# Patient Record
Sex: MALE | Race: WHITE | NOT HISPANIC OR LATINO | Employment: FULL TIME | URBAN - METROPOLITAN AREA
[De-identification: names, ages, dates, MRNs, and addresses within clinical notes are randomized per-mention and may not be internally consistent; named-entity substitution may affect disease eponyms.]

---

## 2019-10-21 ENCOUNTER — OFFICE VISIT (OUTPATIENT)
Dept: FAMILY MEDICINE | Facility: CLINIC | Age: 64
End: 2019-10-21
Payer: MEDICARE

## 2019-10-21 ENCOUNTER — HOSPITAL ENCOUNTER (OUTPATIENT)
Dept: RADIOLOGY | Facility: HOSPITAL | Age: 64
Discharge: HOME | End: 2019-10-21
Attending: REGISTERED NURSE
Payer: MEDICARE

## 2019-10-21 VITALS
WEIGHT: 171.94 LBS | HEIGHT: 67 IN | DIASTOLIC BLOOD PRESSURE: 91 MMHG | SYSTOLIC BLOOD PRESSURE: 135 MMHG | OXYGEN SATURATION: 97 % | HEART RATE: 74 BPM | TEMPERATURE: 98 F | BODY MASS INDEX: 26.99 KG/M2

## 2019-10-21 DIAGNOSIS — E66.3 OVERWEIGHT (BMI 25.0-29.9): ICD-10-CM

## 2019-10-21 DIAGNOSIS — F41.8 MIXED ANXIETY AND DEPRESSIVE DISORDER: ICD-10-CM

## 2019-10-21 DIAGNOSIS — M25.562 CHRONIC PAIN OF LEFT KNEE: Primary | ICD-10-CM

## 2019-10-21 DIAGNOSIS — G89.29 CHRONIC PAIN OF LEFT KNEE: ICD-10-CM

## 2019-10-21 DIAGNOSIS — M25.562 CHRONIC PAIN OF LEFT KNEE: ICD-10-CM

## 2019-10-21 DIAGNOSIS — G89.29 CHRONIC PAIN OF LEFT KNEE: Primary | ICD-10-CM

## 2019-10-21 PROBLEM — Z86.711 HISTORY OF PULMONARY EMBOLISM: Status: ACTIVE | Noted: 2019-10-21

## 2019-10-21 PROBLEM — J45.909 CHILDHOOD ASTHMA WITHOUT COMPLICATION: Status: ACTIVE | Noted: 2019-10-21

## 2019-10-21 PROCEDURE — 73560 X-RAY EXAM OF KNEE 1 OR 2: CPT | Mod: TC,FY,PO,LT

## 2019-10-21 PROCEDURE — 99204 OFFICE O/P NEW MOD 45 MIN: CPT | Mod: PBBFAC,25,PO | Performed by: REGISTERED NURSE

## 2019-10-21 PROCEDURE — 99204 OFFICE O/P NEW MOD 45 MIN: CPT | Mod: S$PBB,,, | Performed by: REGISTERED NURSE

## 2019-10-21 PROCEDURE — 99999 PR PBB SHADOW E&M-NEW PATIENT-LVL IV: ICD-10-PCS | Mod: PBBFAC,,, | Performed by: REGISTERED NURSE

## 2019-10-21 PROCEDURE — 73560 XR KNEE 1 OR 2 VIEW LEFT: ICD-10-PCS | Mod: 26,LT,, | Performed by: RADIOLOGY

## 2019-10-21 PROCEDURE — 73560 X-RAY EXAM OF KNEE 1 OR 2: CPT | Mod: 26,LT,, | Performed by: RADIOLOGY

## 2019-10-21 PROCEDURE — 99999 PR PBB SHADOW E&M-NEW PATIENT-LVL IV: CPT | Mod: PBBFAC,,, | Performed by: REGISTERED NURSE

## 2019-10-21 PROCEDURE — 99204 PR OFFICE/OUTPT VISIT, NEW, LEVL IV, 45-59 MIN: ICD-10-PCS | Mod: S$PBB,,, | Performed by: REGISTERED NURSE

## 2019-10-21 RX ORDER — BUPROPION HYDROCHLORIDE 150 MG/1
150 TABLET ORAL DAILY
Qty: 30 TABLET | Refills: 0 | Status: SHIPPED | OUTPATIENT
Start: 2019-10-21 | End: 2022-10-24

## 2019-10-21 RX ORDER — ESCITALOPRAM OXALATE 10 MG/1
10 TABLET ORAL DAILY
Qty: 30 TABLET | Refills: 0 | Status: SHIPPED | OUTPATIENT
Start: 2019-10-21 | End: 2022-10-24

## 2019-10-21 NOTE — PROGRESS NOTES
"Subjective:       Patient ID: Kishore Sosa is a 64 y.o. male.    Chief Complaint   Patient presents with    Knee Pain       Knee Pain    There was no injury mechanism. The pain is present in the left knee. The pain is at a severity of 2/10. The pain has been fluctuating since onset. Pertinent negatives include no inability to bear weight, loss of motion, loss of sensation, muscle weakness, numbness or tingling. The symptoms are aggravated by movement and weight bearing. He has tried NSAIDs for the symptoms. The treatment provided mild relief.   Depression   Visit Type: follow-up  Patient presents with the following symptoms: anhedonia, decreased concentration, depressed mood, excessive worry, fatigue, feelings of hopelessness, feelings of worthlessness, insomnia, malaise and nervousness/anxiety.  Patient is not experiencing: chest pain, choking sensation, compulsions, confusion, dizziness, dry mouth, hypersomnia, hyperventilation, irritability, memory impairment, muscle tension, nausea, obsessions, palpitations, panic, shortness of breath, suicidal ideas, suicidal planning and thoughts of death.  Severity: causing significant distress   Sleep quality: non-restorative      PT has long h/o depression, was on Wellbutrin and Lexapro.  Not currently in treatment, no med in 5+ years.  Has not been in therapy 10+.  Having sleep problems, low interest in activities, feeling down many days in the week, feels isolated.  Denies suicidal ideations.  Anxiety triggered by foreclosure on home, phobia (does not check postal or e-mails), does not check bank-balance, is behind 2 yrs on taxes.  Family h/o panic disorder and anxiety in father.  Takes much energy to appear "normal" at work.  Has tried self-help exercises and self-hypnosis with only mild relief.  Works at home, plans to retire in 5 yrs.  He has contacted [facility] and is in the process of getting set up with a Telehealth Psychiatrist.  Wanting to restart medication " "until he can speak with MD.    He has completed the PHQ-9 and brings in copy ---- score of 11 ---- moderate depression.        Review of Systems   Constitutional: Negative.  Negative for irritability.   Respiratory: Negative.  Negative for choking and shortness of breath.    Cardiovascular: Negative.  Negative for palpitations.   Musculoskeletal: Positive for arthralgias. Negative for gait problem and joint swelling.   Neurological: Negative for tingling and numbness.   Psychiatric/Behavioral: Positive for decreased concentration, depression, dysphoric mood and sleep disturbance. Negative for confusion, self-injury and suicidal ideas. The patient is nervous/anxious and has insomnia.          Review of patient's allergies indicates:  No Known Allergies          Patient Active Problem List   Diagnosis    Chronic pain of left knee    Mixed anxiety and depressive disorder    Overweight (BMI 25.0-29.9)    History of pulmonary embolism    Childhood asthma without complication         Past medical, surgical, family and social histories have been reviewed today.        Objective:     Vitals:    10/21/19 1039   BP: (!) 135/91   Pulse: 74   Temp: 98.4 °F (36.9 °C)   SpO2: 97%   Weight: 78 kg (171 lb 15.3 oz)   Height: 5' 7" (1.702 m)   PainSc:   2   PainLoc: Knee         Physical Exam   Constitutional: He is oriented to person, place, and time. He appears well-developed and well-nourished. No distress.   Musculoskeletal:        Left knee: He exhibits normal range of motion, no swelling, no effusion, no deformity, no erythema, normal alignment and normal patellar mobility. Tenderness (with increased lateral crepitus) found.   Neurological: He is alert and oriented to person, place, and time.   Skin: He is not diaphoretic.   Psychiatric: His speech is normal. Judgment normal. His mood appears anxious. His affect is not blunt, not labile and not inappropriate. He is agitated. He is not slowed, not withdrawn and not actively " hallucinating. Thought content is not paranoid and not delusional. Cognition and memory are normal. He expresses no suicidal plans and no homicidal plans. He is attentive.         Diagnosis       1. Chronic pain of left knee    2. Mixed anxiety and depressive disorder    3. Overweight (BMI 25.0-29.9)          Assessment/ Plan     Chronic pain of left knee  · Ice, elevate.  · Advil and/or Tylenol prn pain.  · Xray pending.  · May need trial of PT or Orthopedic evaluation.  -     X-ray Knee Ortho Left; Future; Expected date: 10/21/2019    Mixed anxiety and depressive disorder  · Uncontrolled currently, med restarted per pt request.  · To see Psychiatrist through [facility].  -     escitalopram oxalate (LEXAPRO) 10 MG tablet; Take 1 tablet (10 mg total) by mouth once daily.  Dispense: 30 tablet; Refill: 0  -     buPROPion (WELLBUTRIN XL) 150 MG TB24 tablet; Take 1 tablet (150 mg total) by mouth once daily.  Dispense: 30 tablet; Refill: 0    Overweight (BMI 25.0-29.9)  · Healthy diet, regular exercise, weight reduction.  · Healthy lifestyle modifications.      Instructed to f/u in 1 month for annual wellness exam with PCP.  Follow-up in clinic as needed.        Patient Care Team:  Primary Doctor No as PCP - BESS Guerrero  [facility]

## 2021-12-20 ENCOUNTER — OFFICE VISIT (OUTPATIENT)
Dept: DERMATOLOGY | Facility: CLINIC | Age: 66
End: 2021-12-20
Payer: MEDICARE

## 2021-12-20 DIAGNOSIS — L57.0 ACTINIC KERATOSES: Primary | ICD-10-CM

## 2021-12-20 DIAGNOSIS — D48.5 NEOPLASM OF UNCERTAIN BEHAVIOR OF SKIN: ICD-10-CM

## 2021-12-20 PROCEDURE — 88305 TISSUE EXAM BY PATHOLOGIST: CPT | Mod: 26,,, | Performed by: PATHOLOGY

## 2021-12-20 PROCEDURE — 17003 DESTRUCT PREMALG LES 2-14: CPT | Mod: PBBFAC | Performed by: STUDENT IN AN ORGANIZED HEALTH CARE EDUCATION/TRAINING PROGRAM

## 2021-12-20 PROCEDURE — 11103 TANGNTL BX SKIN EA SEP/ADDL: CPT | Mod: S$PBB,,, | Performed by: STUDENT IN AN ORGANIZED HEALTH CARE EDUCATION/TRAINING PROGRAM

## 2021-12-20 PROCEDURE — 99999 PR PBB SHADOW E&M-EST. PATIENT-LVL III: CPT | Mod: PBBFAC,,, | Performed by: STUDENT IN AN ORGANIZED HEALTH CARE EDUCATION/TRAINING PROGRAM

## 2021-12-20 PROCEDURE — 17000 DESTRUCT PREMALG LESION: CPT | Mod: S$PBB,XS,, | Performed by: STUDENT IN AN ORGANIZED HEALTH CARE EDUCATION/TRAINING PROGRAM

## 2021-12-20 PROCEDURE — 17003 DESTRUCTION, PREMALIGNANT LESIONS; SECOND THROUGH 14 LESIONS: ICD-10-PCS | Mod: S$PBB,,, | Performed by: STUDENT IN AN ORGANIZED HEALTH CARE EDUCATION/TRAINING PROGRAM

## 2021-12-20 PROCEDURE — 17003 DESTRUCT PREMALG LES 2-14: CPT | Mod: S$PBB,,, | Performed by: STUDENT IN AN ORGANIZED HEALTH CARE EDUCATION/TRAINING PROGRAM

## 2021-12-20 PROCEDURE — 99203 OFFICE O/P NEW LOW 30 MIN: CPT | Mod: 25,S$PBB,, | Performed by: STUDENT IN AN ORGANIZED HEALTH CARE EDUCATION/TRAINING PROGRAM

## 2021-12-20 PROCEDURE — 99213 OFFICE O/P EST LOW 20 MIN: CPT | Mod: PBBFAC,25 | Performed by: STUDENT IN AN ORGANIZED HEALTH CARE EDUCATION/TRAINING PROGRAM

## 2021-12-20 PROCEDURE — 17000 PR DESTRUCTION(LASER SURGERY,CRYOSURGERY,CHEMOSURGERY),PREMALIGNANT LESIONS,FIRST LESION: ICD-10-PCS | Mod: S$PBB,XS,, | Performed by: STUDENT IN AN ORGANIZED HEALTH CARE EDUCATION/TRAINING PROGRAM

## 2021-12-20 PROCEDURE — 99999 PR PBB SHADOW E&M-EST. PATIENT-LVL III: ICD-10-PCS | Mod: PBBFAC,,, | Performed by: STUDENT IN AN ORGANIZED HEALTH CARE EDUCATION/TRAINING PROGRAM

## 2021-12-20 PROCEDURE — 88305 TISSUE EXAM BY PATHOLOGIST: ICD-10-PCS | Mod: 26,,, | Performed by: PATHOLOGY

## 2021-12-20 PROCEDURE — 88305 TISSUE EXAM BY PATHOLOGIST: CPT | Performed by: PATHOLOGY

## 2021-12-20 PROCEDURE — 99203 PR OFFICE/OUTPT VISIT, NEW, LEVL III, 30-44 MIN: ICD-10-PCS | Mod: 25,S$PBB,, | Performed by: STUDENT IN AN ORGANIZED HEALTH CARE EDUCATION/TRAINING PROGRAM

## 2021-12-20 PROCEDURE — 11102 PR TANGENTIAL BIOPSY, SKIN, SINGLE LESION: ICD-10-PCS | Mod: S$PBB,,, | Performed by: STUDENT IN AN ORGANIZED HEALTH CARE EDUCATION/TRAINING PROGRAM

## 2021-12-20 PROCEDURE — 11103 TANGNTL BX SKIN EA SEP/ADDL: CPT | Mod: PBBFAC | Performed by: STUDENT IN AN ORGANIZED HEALTH CARE EDUCATION/TRAINING PROGRAM

## 2021-12-20 PROCEDURE — 11103 PR TANGENTIAL BIOPSY, SKIN, EA ADDTL LESION: ICD-10-PCS | Mod: S$PBB,,, | Performed by: STUDENT IN AN ORGANIZED HEALTH CARE EDUCATION/TRAINING PROGRAM

## 2021-12-20 PROCEDURE — 11102 TANGNTL BX SKIN SINGLE LES: CPT | Mod: PBBFAC | Performed by: STUDENT IN AN ORGANIZED HEALTH CARE EDUCATION/TRAINING PROGRAM

## 2021-12-20 PROCEDURE — 17000 DESTRUCT PREMALG LESION: CPT | Mod: PBBFAC | Performed by: STUDENT IN AN ORGANIZED HEALTH CARE EDUCATION/TRAINING PROGRAM

## 2021-12-20 PROCEDURE — 11102 TANGNTL BX SKIN SINGLE LES: CPT | Mod: S$PBB,,, | Performed by: STUDENT IN AN ORGANIZED HEALTH CARE EDUCATION/TRAINING PROGRAM

## 2021-12-20 RX ORDER — FLUOROURACIL 50 MG/G
CREAM TOPICAL
Qty: 40 G | Refills: 1 | Status: SHIPPED | OUTPATIENT
Start: 2021-12-20 | End: 2022-10-24

## 2021-12-21 ENCOUNTER — PATIENT MESSAGE (OUTPATIENT)
Dept: DERMATOLOGY | Facility: CLINIC | Age: 66
End: 2021-12-21
Payer: MEDICARE

## 2021-12-22 ENCOUNTER — TELEPHONE (OUTPATIENT)
Dept: FAMILY MEDICINE | Facility: CLINIC | Age: 66
End: 2021-12-22
Payer: MEDICARE

## 2021-12-23 DIAGNOSIS — C44.91 BASAL CELL CARCINOMA (BCC), UNSPECIFIED SITE: Primary | ICD-10-CM

## 2021-12-23 LAB
FINAL PATHOLOGIC DIAGNOSIS: NORMAL
GROSS: NORMAL
Lab: NORMAL
MICROSCOPIC EXAM: NORMAL

## 2022-01-06 ENCOUNTER — PROCEDURE VISIT (OUTPATIENT)
Dept: DERMATOLOGY | Facility: CLINIC | Age: 67
End: 2022-01-06
Payer: MEDICARE

## 2022-01-06 VITALS
DIASTOLIC BLOOD PRESSURE: 90 MMHG | SYSTOLIC BLOOD PRESSURE: 143 MMHG | HEIGHT: 67 IN | BODY MASS INDEX: 26.84 KG/M2 | WEIGHT: 171 LBS | HEART RATE: 74 BPM

## 2022-01-06 DIAGNOSIS — C44.91 BASAL CELL CARCINOMA (BCC), UNSPECIFIED SITE: ICD-10-CM

## 2022-01-06 PROCEDURE — 17312 MOHS ADDL STAGE: CPT | Mod: S$PBB,,, | Performed by: DERMATOLOGY

## 2022-01-06 PROCEDURE — 17311: ICD-10-PCS | Mod: S$PBB,51,, | Performed by: DERMATOLOGY

## 2022-01-06 PROCEDURE — 99214 PR OFFICE/OUTPT VISIT, EST, LEVL IV, 30-39 MIN: ICD-10-PCS | Mod: 57,S$PBB,, | Performed by: DERMATOLOGY

## 2022-01-06 PROCEDURE — 99214 OFFICE O/P EST MOD 30 MIN: CPT | Mod: 57,S$PBB,, | Performed by: DERMATOLOGY

## 2022-01-06 PROCEDURE — 14060 PR ADJ TISS XFER LID,NOS,EAR <10 SQCM: ICD-10-PCS | Mod: S$PBB,,, | Performed by: DERMATOLOGY

## 2022-01-06 PROCEDURE — 14060 TIS TRNFR E/N/E/L 10 SQ CM/<: CPT | Mod: S$PBB,,, | Performed by: DERMATOLOGY

## 2022-01-06 PROCEDURE — 14060 TIS TRNFR E/N/E/L 10 SQ CM/<: CPT | Mod: PBBFAC,PO | Performed by: DERMATOLOGY

## 2022-01-06 PROCEDURE — 17312 MOHS ADDL STAGE: CPT | Mod: PBBFAC,PO | Performed by: DERMATOLOGY

## 2022-01-06 PROCEDURE — 17312: ICD-10-PCS | Mod: S$PBB,,, | Performed by: DERMATOLOGY

## 2022-01-06 PROCEDURE — 17311 MOHS 1 STAGE H/N/HF/G: CPT | Mod: S$PBB,51,, | Performed by: DERMATOLOGY

## 2022-01-06 PROCEDURE — 17311 MOHS 1 STAGE H/N/HF/G: CPT | Mod: PBBFAC,PO | Performed by: DERMATOLOGY

## 2022-01-06 RX ORDER — DOXYCYCLINE 100 MG/1
100 CAPSULE ORAL 2 TIMES DAILY
Qty: 14 CAPSULE | Refills: 0 | Status: SHIPPED | OUTPATIENT
Start: 2022-01-06 | End: 2022-01-13

## 2022-01-06 NOTE — PATIENT INSTRUCTIONS
It was a pleasure to see you today in clinic. Here is some helpful information regarding instructions following your surgery.      Stitches: will not dissolve on their own    Follow up:   * If you have a problem or concern post-operatively, please call ahead to schedule an appointment. We may not be able to accommodate a walk-in appointment *     Scars may take 3 months or longer to mature. If you have concerns about your scar at that point, please call our office to schedule a follow up appointment. There are options available to help improve the appearance.     Please continue wound care below once a day for 1 weeks:    WOUND CARE INSTRUCTIONS   *Washing your hands before touching your bandage and surgical site is extremely important to prevent post-operative infection*    1. Leave your pressure bandage on for 48 hours. You will not need to perform any wound care until this bandage is removed. Do NOT get bandage wet.     2. When you initially begin wound care, you may let the water hit the pressure bandage to loosen it from your skin.     3. Wash your hands thoroughly before starting wound care.     4. After 48 hours, begin cleaning the surgery site once daily with gentle soap (such as Cetaphil, Cerave or Dove). Use a Q-tip with the soap and water on it. Roll the Q-tip along incision line.     5. Dry the area with a fresh cotton tipped applicator/Q-tip.     6. Apply a generous amount of vaseline where you see the sutures. Avoid using Neosporin, or any bacterial ointment as this is likely to cause an allergic reaction to the site.     7. Cut a non-stick bandage to fit then use paper tape to hold in place.     8. You will be using gentle soap and water, vaseline and a bandage once daily for 1 week(s).     9. After surgery, you may restart all of your medications that were stopped (if applicable).     *If your site is on your forehead, or near your eye, you will want to use ice packs. Please apply ice packs every  hour for 20 minutes while awake. Sleep elevated for the first two nights following surgery*     *For surgical areas on your arms/legs, try to keep the area elevated above the level of your heart as much as possible. Frequent gentle rubbing of your fingers or toes in that area will prevent numbness and stiffness*    *If located on your arm/hand, we ask that you do not lift anything heavier than a gallon of milk for two weeks*    *For surgical areas on your head/neck, do not bend over or stoop. Do not drop your head, as this increases blood to the surgical area and can induce bleeding*       BATHING: Begin bathing once pressure bandage comes off. Do not let direct water pressure hit the surgery site. It is okay if it gets wet, just let the water roll over.   PAIN: You may take Tylenol only for pain in the first 24 hours following surgery. Do not take any aspirin, Ibuprofen, Motrin or Aleve as this may increase your risk for bleeding for the first 24 hours. Significant pain/discomfort is unusual and should be reported to our office.   BLEEDING: A mild amount of blood on the bandage is expected. Blood soaking through the bandage is not normal. If this occurs, apply uninterrupted pressure for 20 minutes by the clock. If this does not stop the bleeding, hold pressure for 20 minutes with an ice pack. If it does not stop after ice, please call our office for additional assistance.       Normal office hour number: [phone]    After hours Dermatologist on call: [phone] (Triage Nurse)      VERY IMPORTANT MOHS INSTRUCTIONS        Please call the nurse's line ([phone]) if you experience any of the following issues after surgery.  If no one answer, please leave a voice message and someone will return your call in 1 hour.  If you DO NOT receive a call back in 1 hour, please call [phone] and speak to the Triage Nurse and they will instruct you from there.      1. Extreme pain that routine Tylenol or Ibuprofen can  not help   2. Uncontrollable bleeding that will not stop after holding firm pressure to the area for 20 minutes   3. Signs and symptoms of infections such as draining pus or tender to the touch   4. Any issues that may be out of the normal for you prior to surgery

## 2022-01-06 NOTE — PROCEDURES
Date of Service: 01/06/2022  Surgery: Mohs micrographic surgery  Tumor Type: BCC  Location: right nasal sidewall  Mohs AUC: 8  Indication: tumor location  Repair Type: V-Y nasalis sling advancement flap (Island Pedicle Flap)  Repair Size: 1.7 x 1.3 cm  Suture Material: 4-0 monocryl; 6-0 Prolene  Derm-Path Accession #: [id number]  PreOp Size: 0.9 x 0.8 cm  PostOp Size: 1.7 x 1.3 cm  Mohs Accession #: [id number]  Level of Defect: muscle  Anesthesia volume: 4 cc (Mohs), 4 cc (Reconstruction)  Stages: 2     Surgeon and Pathologist: Dr. Chaka Che MD  Assistants: Minerva العراقي LPN     All components of Universal Protocol/PAUSE Rule completed. Dr. Chaka Che MD operated in two distinct and integrated capacities as the surgeon and pathologist.     Procedure Details:  Stage 1:  The patient was placed supine on the operating table. The cancer/biopsy site was identified, outlined with a marker, and verified by the patient. A rim of normal appearing skin was marked circumferentially around the  lesion. The area was prepped with antiseptic. The area was infiltrated with local anesthesia (1% lidocaine with epinephrine 1:100,000). The tumor was first sharply debulked to remove clinically apparent tumor. A beveled incision following the standard Mohs approach was done and the specimen was removed.The layer of tissue was then transferred onto a specimen sheet maintaining the orientation of the specimen. Hemostasis was achieved with electrocoagulation, pressure and suture ligature as needed. The wound site was then covered with a pressure dressing while the tissue samples were processed for examination. The excised tissue was transported to the Mohs histology laboratory maintaining the tissue orientation. The tissue specimen was relaxed so that the entire surgical margin was in a a single horizontal plane for sectioning and inked for precise mapping. A precise reference map was drawn to reflect the sectioning of the  specimen, colored inking of the margins, and orientation on the patient. The tissue was processed using horizontal sectioning of the base and continuous peripheral margins. The histopathologic sections were reviewed in conjunction with the reference map.  Total tissue blocks: 1  Total slides: 3     Frozen section histology: Residual tumor identified on stage 1.   Histology: There were small irregular aggregates of  atypical basaloid cells with high nuclear cytoplasmic ratios and peripheral palisading of their nuclei in the subcutaneous fat and dermis.   Depth of Invasion: fat.   Perineural Invasion: none.   Scar Tissue: absent.     Stage 2:  Residual tumor was appreciated at the deep and peripheral margin of the tissue section on histologic exam. Therefore, an additional stage of Mohs surgery was performed. The area of residual tumor was identified on the patient. The layer of tissue was then surgically excised using a #15 blade and was then transferred onto a specimen sheet maintaining the orientation of the specimen. Hemostasis was achieved with electrocoagulation, pressure and suture ligature as needed. The wound site was then covered with a dressing while the tissue samples were processed for examination.  The excised tissue was transported to the Mohs histology laboratory maintaining the tissue orientation. The tissue specimen was relaxed so that the entire surgical margin was in a a single horizontal plane for sectioning and inked for precise mapping. A precise reference map  was drawn to reflect the sectioning of the specimen, colored inking of the margins, and orientation on the patient. The tissue was processed using horizontal sectioning of the base and continuous peripheral margins. The histopathologic sections were reviewed in conjunction with the reference map.  Total tissue blocks: 1  Total slides: 2     Frozen section histology: No residual tumor visualized.   Histology: There were no malignant cells  seen in the sections examined. Normal histologic structures noted.      No additional tumor was identified on microscopic examination, therefore Mohs surgery was complete.       Reconstruction: Island Pedicle Flap (V to Y nasalis sling advancement flap)     The patient was taken to the operative suite and placed supine on the operating room table. The wound was identified and the planned reconstruction was outlined with a marker. The area  was infiltrated with Lidocaine 1% with epinephrine 1:100,000 and prepped in a sterile fashion using iodine and sterile drapes were placed. The wound was debeveled. The flap was incised with a #15 scalpel, down to the level  of fat. The flap was carefully released from the surrounding fascial attachments by undermining around the flap beneath the nasalis muscle medially and above the muscular attachment laterally, developing a broad muscular pedicle with appropriate mobility. Hemostasis was obtained with monopolar electrocoagulation.  The flap was then advanced into the defect and secured with buried vertical mattress sutures where necessary. The wound edges were meticulously approximated using percutaneous running 6-0 sutures. A basting stitch from the under surface of the flap was placed through and through into the nasal vestibule to maintain the nasal valve.      The wound was cleansed with saline and ointment was applied along the wound surface. A sterile pressure dressing was applied. Wound care instructions were given verbally and in writing. The patient left the operating suite in stable condition. Patient was informed that additional refinement of the resulting surgical scar may be used as a second stage of this  reconstruction.      RTC 1 week for suture removal.     Chaka Che MD  Department of Dermatology, Mohs Surgery  2:57 PM  1/6/2022

## 2022-01-06 NOTE — PROGRESS NOTES
REFERRING PROVIDER:  Dr. Chahal    CHIEF COMPLAINT:  Established patient being consulted for Mohs' surgery evaluation.    HISTORY OF PRESENT ILLNESS:  66 y.o. male presents with a 1 year(s) history of growth on the 1) right nasal sidewall 2) forehead and 3) left temple .    Positive for scabbing.  Positive for crusting.  Positive for bleeding.  Negative for itching.    Biopsy consistent with 1) BCC 2) BCC and 3) BCC.     No prior treatment.    Dr. Chahal's clinic notes at time of biopsies reviewed today (12/20/2021).    Pacemaker: No  Defibrillator: No  Artificial joints: No  Artificial heart valves: No    PAST MEDICAL HISTORY:  Past Medical History:   Diagnosis Date    Anxiety     Asthma     childhood    Depression     Pulmonary embolism        PAST SURGICAL HISTORY:  Past Surgical History:   Procedure Laterality Date    CHOLECYSTECTOMY          SOCIAL HISTORY:  Dependencies:  former smoker    PERTINENT MEDICATIONS:  See medications list.  no anticoagulants    Of note, patient currently using Efudex 5% cream treatment to forehead and temples.     ALLERGIES:  Patient has no known allergies.    ROS:  Skin: See HPI      PE:  Physical Exam    General: Mood and affect normal. Alert and orient X3. Normal appearance.    Head/Face: right nasal sidewall: crusted atrophic papule, forehead: crusted pink papule, left temple: crusted pink papule     Bilat. Forehead/temples: crusted erythematous patches c/w areas undergoing Efudex treatment    IMPRESSION:  Biopsy proven 1) nodular BCC, right nasal sidewall; 2) nodular BCC, forehead; 3) nodular BCC, left temple     PLAN:  1) nBCC, right nasal sidewall  The diagnosis and the pathology report were discussed in detail with the patient. Treatment options were reviewed, including Mohs Micrographic Surgery, radiation, topical therapy, and standard excision.  After careful review of patient's history and physical exam, and after discussion of treatment options, the decision was made to  perform Mohs micrographic surgery.    Scheduled patient for Mohs Micrographic Surgery. Procedure today. Risks, benefits, and alternatives of Mohs' surgery discussed with the patient. Discussed repair options including complex closure, skin flap, skin graft and second intention healing with the patient. Pre-operative instructions provided to the patient.    2) nBCC, forehead  The diagnosis and the pathology report were discussed in detail with the patient. Treatment options were reviewed, including Mohs Micrographic Surgery, radiation, topical therapy, and standard excision.  After careful review of patient's history and physical exam, and after discussion of treatment options, the decision was made to perform Mohs micrographic surgery.    Scheduled patient for Mohs Micrographic Surgery. Procedure in the near future. Risks, benefits, and alternatives of Mohs' surgery discussed with the patient. Discussed repair options including complex closure, skin flap, skin graft and second intention healing with the patient. Pre-operative instructions provided to the patient.    3) nBCC, left temple  The diagnosis and the pathology report were discussed in detail with the patient. Treatment options were reviewed, including Mohs Micrographic Surgery, radiation, topical therapy, and standard excision.  After careful review of patient's history and physical exam, and after discussion of treatment options, the decision was made to perform Mohs micrographic surgery.    Scheduled patient for Mohs Micrographic Surgery. Procedure in the near future. Risks, benefits, and alternatives of Mohs' surgery discussed with the patient. Discussed repair options including complex closure, skin flap, skin graft and second intention healing with the patient. Pre-operative instructions provided to the patient.    Recommend patient hold Efudex therapy for one week prior to and following MMS for forehead and left temple lesions.       Thank you for  consulting with me in the care of this patient. The patient will be returning to you after the completion of the post-operative care.    Consulting report is sent to the consulting provider.

## 2022-01-13 ENCOUNTER — PROCEDURE VISIT (OUTPATIENT)
Dept: DERMATOLOGY | Facility: CLINIC | Age: 67
End: 2022-01-13
Payer: MEDICARE

## 2022-01-13 VITALS
DIASTOLIC BLOOD PRESSURE: 89 MMHG | SYSTOLIC BLOOD PRESSURE: 161 MMHG | WEIGHT: 171 LBS | HEIGHT: 67 IN | BODY MASS INDEX: 26.84 KG/M2 | HEART RATE: 79 BPM

## 2022-01-13 DIAGNOSIS — C44.319 BASAL CELL CARCINOMA (BCC) OF LEFT TEMPLE REGION: ICD-10-CM

## 2022-01-13 DIAGNOSIS — C44.319 BASAL CELL CARCINOMA (BCC) OF GLABELLA: Primary | ICD-10-CM

## 2022-01-13 PROCEDURE — 17312: ICD-10-PCS | Mod: S$PBB,79,, | Performed by: DERMATOLOGY

## 2022-01-13 PROCEDURE — 13132 CMPLX RPR F/C/C/M/N/AX/G/H/F: CPT | Mod: S$PBB,79,, | Performed by: DERMATOLOGY

## 2022-01-13 PROCEDURE — 13132 CMPLX RPR F/C/C/M/N/AX/G/H/F: CPT | Mod: 79,PBBFAC,PO | Performed by: DERMATOLOGY

## 2022-01-13 PROCEDURE — 17312 MOHS ADDL STAGE: CPT | Mod: S$PBB,79,, | Performed by: DERMATOLOGY

## 2022-01-13 PROCEDURE — 17311: ICD-10-PCS | Mod: 79,76,S$PBB, | Performed by: DERMATOLOGY

## 2022-01-13 PROCEDURE — 17312 MOHS ADDL STAGE: CPT | Mod: 79,PBBFAC,PO | Performed by: DERMATOLOGY

## 2022-01-13 PROCEDURE — 17311 MOHS 1 STAGE H/N/HF/G: CPT | Mod: 76,PBBFAC,PO | Performed by: DERMATOLOGY

## 2022-01-13 PROCEDURE — 13132 PR RECMPL WND HEAD,FAC,HAND 2.6-7.5 CM: ICD-10-PCS | Mod: S$PBB,79,, | Performed by: DERMATOLOGY

## 2022-01-13 PROCEDURE — 17311 MOHS 1 STAGE H/N/HF/G: CPT | Mod: 79,76,S$PBB, | Performed by: DERMATOLOGY

## 2022-01-13 PROCEDURE — 99499 UNLISTED E&M SERVICE: CPT | Mod: S$PBB,,, | Performed by: DERMATOLOGY

## 2022-01-13 PROCEDURE — 99499 NO LOS: ICD-10-PCS | Mod: S$PBB,,, | Performed by: DERMATOLOGY

## 2022-01-13 RX ORDER — HYDROCORTISONE 25 MG/G
CREAM TOPICAL 2 TIMES DAILY
Qty: 30 G | Refills: 1 | Status: SHIPPED | OUTPATIENT
Start: 2022-01-13 | End: 2022-10-24

## 2022-01-13 NOTE — PROCEDURES
Procedure #1:  Date of Service: 01/13/2022  Surgery: Mohs micrographic surgery  Tumor Type: BCC  Location: forehead  Mohs AUC: 8  Indication: tumor location  Repair Type: CLC  Repair Size: 2.9 cm  Suture Material: 4-0 monocryl; 6-0 Prolene  Derm-Path Accession #: [id number]  PreOp Size: 1.1 x 1.0 cm  PostOp Size: 1.6 x 1.5 cm  Mohs Accession #: [id number]  Level of Defect: fat  Anesthesia volume: 2 cc (Mohs), 4 cc (Reconstruction)  Stages: 1     Surgeon and Pathologist: Dr. Chaka Che MD  Assistants: Minerva العراقي LPN     All components of Universal Protocol/PAUSE Rule completed. Dr. Chaka Che MD operated in two distinct and integrated capacities as the surgeon and pathologist.     Procedure Details:  Stage 1:  The patient was placed supine on the operating table. The cancer/biopsy site was identified, outlined with a marker, and verified by the patient. A rim of normal appearing skin was marked circumferentially around the  lesion. The area was prepped with antiseptic. The area was infiltrated with local anesthesia (1% lidocaine with epinephrine 1:100,000). The tumor was first sharply debulked to remove clinically apparent tumor. A beveled incision following the standard Mohs approach was done and the specimen was removed.The layer of tissue was then transferred onto a specimen sheet maintaining the orientation of the specimen. Hemostasis was achieved with electrocoagulation, pressure and suture ligature as needed. The wound site was then covered with a pressure dressing while the tissue samples were processed for examination. The excised tissue was transported to the Mohs histology laboratory maintaining the tissue orientation. The tissue specimen was relaxed so that the entire surgical margin was in a a single horizontal plane for sectioning and inked for precise mapping. A precise reference map was drawn to reflect the sectioning of the specimen, colored inking of the margins, and orientation on the  patient. The tissue was processed using horizontal sectioning of the base and continuous peripheral margins. The histopathologic sections were reviewed in conjunction with the reference map.  Total tissue blocks: 1  Total slides: 3      Frozen section histology: No residual tumor visualized.   Histology: There were no malignant cells seen in the sections examined. Normal histologic structures noted.      No additional tumor was identified on microscopic examination, therefore Mohs surgery was complete.     Reconstruction: Complex Linear Closure   Primary Surgeon : MD Chinedu  Assistant Surgeon : Minerva العراقي LPN  The patient was taken to the operative suite and placed supine on the operating room table. The defect was identified. Appropriate markings were made with a marking pen to plan the repair. The area was infiltrated with Lidocaine 1% with epinephrine 1:100,000 and prepped with iodine and draped with sterile towels. The wound was debeveled and undermined widely. Hemostasis was obtained using monopolar electrocoagulation. The wound was narrowed and the dermis and subcutaneous tissue were then approximated using buried vertical mattress sutures of 4-0 monocryl. Care was taken to orient tension vectors of the closure to minimize distortion of free margins. Cones of redundant tissue were then excised within relaxed skin tension lines on both sides of the defect. Additional buried sutures were placed in a similar fashion where needed. Percutaneous interrupted and running 6-0 prolene sutures were carefully placed for maximum eversion and meticulous approximation of the epidermis.  Repair Size: 2.9 cm     The wound was cleansed with saline and ointment was applied along the wound surface. A sterile pressure dressing was applied. Wound care instructions were given verbally and in writing. The patient left the operating suite in stable condition. Patient was informed that additional refinement of the resulting surgical  scar may be used as a second stage of this reconstruction.        RTC 1 week for suture removal    Procedure #2:  Date of Service: 01/13/2022  Surgery: Mohs micrographic surgery  Tumor Type: BCC  Location: left temple  Mohs AUC: 8  Indication: tumor location  Repair Type: CLC  Repair Size: 4.1 cm  Suture Material: 4-0 monocryl; 6-0 Prolene  Derm-Path Accession #: [id number]  PreOp Size: 1.3 x 1.3 cm  PostOp Size: 2.1 x 2.0 cm  Mohs Accession #: [id number]  Level of Defect: muscle  Anesthesia volume: 3 cc (Mohs), 6 cc (Reconstruction)  Stages: 2     Surgeon and Pathologist: Dr. Chaka Che MD  Assistants: Minerva العراقي LPN     All components of Universal Protocol/PAUSE Rule completed. Dr. Chaka Che MD operated in two distinct and integrated capacities as the surgeon and pathologist.     Procedure Details:  Stage 1:  The patient was placed supine on the operating table. The cancer/biopsy site was identified, outlined with a marker, and verified by the patient. A rim of normal appearing skin was marked circumferentially around the  lesion. The area was prepped with antiseptic. The area was infiltrated with local anesthesia (1% lidocaine with epinephrine 1:100,000). The tumor was first sharply debulked to remove clinically apparent tumor. A beveled incision following the standard Mohs approach was done and the specimen was removed.The layer of tissue was then transferred onto a specimen sheet maintaining the orientation of the specimen. Hemostasis was achieved with electrocoagulation, pressure and suture ligature as needed. The wound site was then covered with a pressure dressing while the tissue samples were processed for examination. The excised tissue was transported to the Mohs histology laboratory maintaining the tissue orientation. The tissue specimen was relaxed so that the entire surgical margin was in a a single horizontal plane for sectioning and inked for precise mapping. A precise reference map was  drawn to reflect the sectioning of the specimen, colored inking of the margins, and orientation on the patient. The tissue was processed using horizontal sectioning of the base and continuous peripheral margins. The histopathologic sections were reviewed in conjunction with the reference map.  Total tissue blocks: 1  Total slides: 3     Frozen section histology: Residual tumor identified on stage 1.   Histology: There were small irregular aggregates of  atypical basaloid cells with high nuclear cytoplasmic ratios and peripheral palisading of their nuclei in the subcutaneous fat.  Depth of Invasion: fat.   Perineural Invasion: none.   Scar Tissue: absent.     Stage 2:  Residual tumor was appreciated at the deep margin of the tissue section on histologic exam. Therefore, an additional stage of Mohs surgery was performed. The area of residual tumor was identified on the patient. The layer of tissue was then surgically excised using a #15 blade and was then transferred onto a specimen sheet maintaining the orientation of the specimen. Hemostasis was achieved with electrocoagulation, pressure and suture ligature as needed. The wound site was then covered with a dressing while the tissue samples were processed for examination.  The excised tissue was transported to the Mohs histology laboratory maintaining the tissue orientation. The tissue specimen was relaxed so that the entire surgical margin was in a a single horizontal plane for sectioning and inked for precise mapping. A precise reference map  was drawn to reflect the sectioning of the specimen, colored inking of the margins, and orientation on the patient. The tissue was processed using horizontal sectioning of the base and continuous peripheral margins. The histopathologic sections were reviewed in conjunction with the reference map.  Total tissue blocks: 1  Total slides: 3     Frozen section histology: No residual tumor visualized.   Histology: There were no  malignant cells seen in the sections examined. Normal histologic structures noted.      No additional tumor was identified on microscopic examination, therefore Mohs surgery was complete.     Reconstruction: Complex Linear Closure   Primary Surgeon : MD Chinedu  Assistant Surgeon : Minerva العراقي LPN  The patient was taken to the operative suite and placed supine on the operating room table. The defect was identified. Appropriate markings were made with a marking pen to plan the repair. The area was infiltrated with Lidocaine 1% with epinephrine 1:100,000 and prepped with iodine and draped with sterile towels. The wound was debeveled and undermined widely. Hemostasis was obtained using monopolar electrocoagulation. The wound was narrowed and the dermis and subcutaneous tissue were then approximated using buried vertical mattress sutures of 4-0 monocryl. Care was taken to orient tension vectors of the closure to minimize distortion of free margins. Cones of redundant tissue were then excised within relaxed skin tension lines on both sides of the defect. Additional buried sutures were placed in a similar fashion where needed. Percutaneous interrupted and running 6-0 prolene sutures were carefully placed for maximum eversion and meticulous approximation of the epidermis.  Repair Size: 4.1 cm     The wound was cleansed with saline and ointment was applied along the wound surface. A sterile pressure dressing was applied. Wound care instructions were given verbally and in writing. The patient left the operating suite in stable condition. Patient was informed that additional refinement of the resulting surgical scar may be used as a second stage of this reconstruction.        RTC 1 week for suture removal    Chaka Che MD  Department of Dermatology, Mohs Surgery  1:39 PM  1/13/2022

## 2022-01-13 NOTE — PATIENT INSTRUCTIONS
It was a pleasure to see you today in clinic. Here is some helpful information regarding instructions following your surgery.      Stitches: will not dissolve on their own    Follow up:   * If you have a problem or concern post-operatively, please call ahead to schedule an appointment. We may not be able to accommodate a walk-in appointment *     Scars may take 3 months or longer to mature. If you have concerns about your scar at that point, please call our office to schedule a follow up appointment. There are options available to help improve the appearance.     Please continue wound care below once a day for 1 weeks:    WOUND CARE INSTRUCTIONS   *Washing your hands before touching your bandage and surgical site is extremely important to prevent post-operative infection*    1. Leave your pressure bandage on for 48 hours. You will not need to perform any wound care until this bandage is removed. Do NOT get bandage wet.     2. When you initially begin wound care, you may let the water hit the pressure bandage to loosen it from your skin.     3. Wash your hands thoroughly before starting wound care.     4. After 48 hours, begin cleaning the surgery site once daily with gentle soap (such as Cetaphil, Cerave or Dove). Use a Q-tip with the soap and water on it. Roll the Q-tip along incision line.     5. Dry the area with a fresh cotton tipped applicator/Q-tip.     6. Apply a generous amount of vaseline where you see the sutures. Avoid using Neosporin, or any bacterial ointment as this is likely to cause an allergic reaction to the site.     7. Cut a non-stick bandage to fit then use paper tape to hold in place.     8. You will be using gentle soap and water, vaseline and a bandage once daily for 1 week(s).     9. After surgery, you may restart all of your medications that were stopped (if applicable).     *If your site is on your forehead, or near your eye, you will want to use ice packs. Please apply ice packs every  hour for 20 minutes while awake. Sleep elevated for the first two nights following surgery*     *For surgical areas on your arms/legs, try to keep the area elevated above the level of your heart as much as possible. Frequent gentle rubbing of your fingers or toes in that area will prevent numbness and stiffness*    *If located on your arm/hand, we ask that you do not lift anything heavier than a gallon of milk for two weeks*    *For surgical areas on your head/neck, do not bend over or stoop. Do not drop your head, as this increases blood to the surgical area and can induce bleeding*       BATHING: Begin bathing once pressure bandage comes off. Do not let direct water pressure hit the surgery site. It is okay if it gets wet, just let the water roll over.   PAIN: You may take Tylenol only for pain in the first 24 hours following surgery. Do not take any aspirin, Ibuprofen, Motrin or Aleve as this may increase your risk for bleeding for the first 24 hours. Significant pain/discomfort is unusual and should be reported to our office.   BLEEDING: A mild amount of blood on the bandage is expected. Blood soaking through the bandage is not normal. If this occurs, apply uninterrupted pressure for 20 minutes by the clock. If this does not stop the bleeding, hold pressure for 20 minutes with an ice pack. If it does not stop after ice, please call our office for additional assistance.       Normal office hour number: [phone]    After hours Dermatologist on call: [phone] (Triage Nurse)       VERY IMPORTANT MOHS INSTRUCTIONS        Please call the nurse's line ([phone]) if you experience any of the following issues after surgery.  If no one answer, please leave a voice message and someone will return your call in 1 hour.  If you DO NOT receive a call back in 1 hour, please call [phone] and speak to the Triage Nurse and they will instruct you from there.      1. Extreme pain that routine Tylenol or Ibuprofen  can not help   2. Uncontrollable bleeding that will not stop after holding firm pressure to the area for 20 minutes   3. Signs and symptoms of infections such as draining pus or tender to the touch   4. Any issues that may be out of the normal for you prior to surgery

## 2022-01-20 ENCOUNTER — CLINICAL SUPPORT (OUTPATIENT)
Dept: DERMATOLOGY | Facility: CLINIC | Age: 67
End: 2022-01-20
Payer: MEDICARE

## 2022-01-20 DIAGNOSIS — Z48.02 VISIT FOR SUTURE REMOVAL: Primary | ICD-10-CM

## 2022-01-20 PROCEDURE — 99024 PR POST-OP FOLLOW-UP VISIT: ICD-10-PCS | Mod: POP,,, | Performed by: DERMATOLOGY

## 2022-01-20 PROCEDURE — 99999 PR PBB SHADOW E&M-EST. PATIENT-LVL III: CPT | Mod: PBBFAC,,,

## 2022-01-20 PROCEDURE — 99213 OFFICE O/P EST LOW 20 MIN: CPT | Mod: PBBFAC,PO

## 2022-01-20 PROCEDURE — 99024 POSTOP FOLLOW-UP VISIT: CPT | Mod: POP,,, | Performed by: DERMATOLOGY

## 2022-01-20 PROCEDURE — 99999 PR PBB SHADOW E&M-EST. PATIENT-LVL III: ICD-10-PCS | Mod: PBBFAC,,,

## 2022-01-20 NOTE — PROGRESS NOTES
Patient presents for suture removal. The wound is well healed without signs of infection.  The sutures are removed. Wound care and activity instructions given. Return in 3 months.

## 2022-01-20 NOTE — PATIENT INSTRUCTIONS
Post-operative recommendations until follow-up:    1) moisturizing lotion with sunscreen with at least SPF 30 (one example is Cerave AM lotion)    2) Scar Away silicone sheets while sleeping

## 2022-01-31 ENCOUNTER — OFFICE VISIT (OUTPATIENT)
Dept: DERMATOLOGY | Facility: CLINIC | Age: 67
End: 2022-01-31
Payer: MEDICARE

## 2022-01-31 DIAGNOSIS — L82.1 SEBORRHEIC KERATOSES: ICD-10-CM

## 2022-01-31 DIAGNOSIS — L81.4 SOLAR LENTIGO: ICD-10-CM

## 2022-01-31 DIAGNOSIS — L57.0 ACTINIC KERATOSES: ICD-10-CM

## 2022-01-31 DIAGNOSIS — D48.5 NEOPLASM OF UNCERTAIN BEHAVIOR OF SKIN: ICD-10-CM

## 2022-01-31 DIAGNOSIS — Z85.828 HISTORY OF NONMELANOMA SKIN CANCER: Primary | ICD-10-CM

## 2022-01-31 PROCEDURE — 99999 PR PBB SHADOW E&M-EST. PATIENT-LVL III: ICD-10-PCS | Mod: PBBFAC,,, | Performed by: STUDENT IN AN ORGANIZED HEALTH CARE EDUCATION/TRAINING PROGRAM

## 2022-01-31 PROCEDURE — 88305 TISSUE EXAM BY PATHOLOGIST: ICD-10-PCS | Mod: 26,,, | Performed by: PATHOLOGY

## 2022-01-31 PROCEDURE — 99213 OFFICE O/P EST LOW 20 MIN: CPT | Mod: 25,S$PBB,, | Performed by: STUDENT IN AN ORGANIZED HEALTH CARE EDUCATION/TRAINING PROGRAM

## 2022-01-31 PROCEDURE — 88305 TISSUE EXAM BY PATHOLOGIST: CPT | Mod: 26,,, | Performed by: PATHOLOGY

## 2022-01-31 PROCEDURE — 17003 DESTRUCT PREMALG LES 2-14: CPT | Mod: S$PBB,,, | Performed by: STUDENT IN AN ORGANIZED HEALTH CARE EDUCATION/TRAINING PROGRAM

## 2022-01-31 PROCEDURE — 99999 PR PBB SHADOW E&M-EST. PATIENT-LVL III: CPT | Mod: PBBFAC,,, | Performed by: STUDENT IN AN ORGANIZED HEALTH CARE EDUCATION/TRAINING PROGRAM

## 2022-01-31 PROCEDURE — 11103 TANGNTL BX SKIN EA SEP/ADDL: CPT | Mod: S$PBB,,, | Performed by: STUDENT IN AN ORGANIZED HEALTH CARE EDUCATION/TRAINING PROGRAM

## 2022-01-31 PROCEDURE — 17000 DESTRUCT PREMALG LESION: CPT | Mod: PBBFAC,XS | Performed by: STUDENT IN AN ORGANIZED HEALTH CARE EDUCATION/TRAINING PROGRAM

## 2022-01-31 PROCEDURE — 11103 PR TANGENTIAL BIOPSY, SKIN, EA ADDTL LESION: ICD-10-PCS | Mod: S$PBB,,, | Performed by: STUDENT IN AN ORGANIZED HEALTH CARE EDUCATION/TRAINING PROGRAM

## 2022-01-31 PROCEDURE — 17000 PR DESTRUCTION(LASER SURGERY,CRYOSURGERY,CHEMOSURGERY),PREMALIGNANT LESIONS,FIRST LESION: ICD-10-PCS | Mod: S$PBB,XS,79, | Performed by: STUDENT IN AN ORGANIZED HEALTH CARE EDUCATION/TRAINING PROGRAM

## 2022-01-31 PROCEDURE — 17003 DESTRUCTION, PREMALIGNANT LESIONS; SECOND THROUGH 14 LESIONS: ICD-10-PCS | Mod: S$PBB,,, | Performed by: STUDENT IN AN ORGANIZED HEALTH CARE EDUCATION/TRAINING PROGRAM

## 2022-01-31 PROCEDURE — 99213 OFFICE O/P EST LOW 20 MIN: CPT | Mod: PBBFAC | Performed by: STUDENT IN AN ORGANIZED HEALTH CARE EDUCATION/TRAINING PROGRAM

## 2022-01-31 PROCEDURE — 88305 TISSUE EXAM BY PATHOLOGIST: CPT | Mod: 59 | Performed by: PATHOLOGY

## 2022-01-31 PROCEDURE — 11102 PR TANGENTIAL BIOPSY, SKIN, SINGLE LESION: ICD-10-PCS | Mod: S$PBB,79,, | Performed by: STUDENT IN AN ORGANIZED HEALTH CARE EDUCATION/TRAINING PROGRAM

## 2022-01-31 PROCEDURE — 11102 TANGNTL BX SKIN SINGLE LES: CPT | Mod: S$PBB,79,, | Performed by: STUDENT IN AN ORGANIZED HEALTH CARE EDUCATION/TRAINING PROGRAM

## 2022-01-31 PROCEDURE — 17000 DESTRUCT PREMALG LESION: CPT | Mod: S$PBB,XS,79, | Performed by: STUDENT IN AN ORGANIZED HEALTH CARE EDUCATION/TRAINING PROGRAM

## 2022-01-31 PROCEDURE — 99213 PR OFFICE/OUTPT VISIT, EST, LEVL III, 20-29 MIN: ICD-10-PCS | Mod: 25,S$PBB,, | Performed by: STUDENT IN AN ORGANIZED HEALTH CARE EDUCATION/TRAINING PROGRAM

## 2022-01-31 PROCEDURE — 11102 TANGNTL BX SKIN SINGLE LES: CPT | Mod: PBBFAC,79 | Performed by: STUDENT IN AN ORGANIZED HEALTH CARE EDUCATION/TRAINING PROGRAM

## 2022-01-31 PROCEDURE — 11103 TANGNTL BX SKIN EA SEP/ADDL: CPT | Mod: PBBFAC | Performed by: STUDENT IN AN ORGANIZED HEALTH CARE EDUCATION/TRAINING PROGRAM

## 2022-01-31 PROCEDURE — 17003 DESTRUCT PREMALG LES 2-14: CPT | Mod: PBBFAC | Performed by: STUDENT IN AN ORGANIZED HEALTH CARE EDUCATION/TRAINING PROGRAM

## 2022-01-31 NOTE — PATIENT INSTRUCTIONS
Shave Biopsy Wound Care    Your doctor has performed a shave biopsy today.  A band aid and vaseline ointment has been placed over the site.  This should remain in place for NO LONGER THAN 48 hours.  It is fine to remove the bandaid after 24 hours, if the area is no longer bleeding. It is recommended that you keep the area dry (do not wet)) for the first 24 hours.  After 24 hours, wash the area with warm soap and water and apply Vaseline jelly.  Many patients prefer to use Neosporin or Bacitracin ointment.  This is acceptable; however, know that you can develop an allergy to this medication even if you have used it safely for years.  It is important to keep the area moist.  Letting it dry out and get air slows healing time, and will worsen the scar.        If you notice increasing redness, tenderness, pain, or yellow drainage at the biopsy site, please notify your doctor.  These are signs of an infection.    If your biopsy site is bleeding, apply firm pressure for 15 minutes straight.  Repeat for another 15 minutes, if it is still bleeding.   If the surgical site continues to bleed, then please contact your doctor.      For [facility] users:   You will receive your biopsy results in [facility] as soon as they are available. Please be assured that your physician/provider will review your results and will then determine what further treatment, evaluation, or planning is required. You should be contacted by your physician's/provider's office within 5 business days of receiving your results; If not, please reach out to directly. This is one more way [employer] is putting you first.     [facility] [place] [address]/ [phone] FAX/ [email or web address]

## 2022-01-31 NOTE — PROGRESS NOTES
Patient Information  Name: Kishore Sosa  : 1955  MRN: [id number]     Referring Physician:  Dr. Ma ref. provider found   Primary Care Physician:   Primary Doctor Francesca   Date of Visit: 2022      Subjective:       Kishore Sosa is a 66 y.o. male who presents for   Chief Complaint   Patient presents with    Follow-up     Follow up Efudex.     HPI   Pt here for follow up. He has hx of NMSC. This is a high risk patient here to check for the development of new lesions.    Patient was last seen:2021     Prior notes by myself reviewed.   Clinical documentation obtained by nursing staff reviewed.    Review of Systems   Skin: Negative for itching and rash.        Objective:    Physical Exam   Constitutional: He appears well-developed and well-nourished. No distress.   Neurological: He is alert and oriented to person, place, and time. He is not disoriented.   Psychiatric: He has a normal mood and affect.   Skin:   Areas Examined (abnormalities noted in diagram):   Head / Face Inspection Performed  Neck Inspection Performed  Chest / Axilla Inspection Performed  Back Inspection Performed  RUE Inspected  LUE Inspection Performed                   Diagram Legend     Erythematous scaling macule/papule c/w actinic keratosis       Vascular papule c/w angioma      Pigmented verrucoid papule/plaque c/w seborrheic keratosis      Yellow umbilicated papule c/w sebaceous hyperplasia      Irregularly shaped tan macule c/w lentigo     1-2 mm smooth white papules consistent with Milia      Movable subcutaneous cyst with punctum c/w epidermal inclusion cyst      Subcutaneous movable cyst c/w pilar cyst      Firm pink to brown papule c/w dermatofibroma      Pedunculated fleshy papule(s) c/w skin tag(s)      Evenly pigmented macule c/w junctional nevus     Mildly variegated pigmented, slightly irregular-bordered macule c/w mildly atypical nevus      Flesh colored to evenly pigmented papule c/w intradermal nevus        Pink pearly papule/plaque c/w basal cell carcinoma      Erythematous hyperkeratotic cursted plaque c/w SCC      Surgical scar with no sign of skin cancer recurrence      Open and closed comedones      Inflammatory papules and pustules      Verrucoid papule consistent consistent with wart     Erythematous eczematous patches and plaques     Dystrophic onycholytic nail with subungual debris c/w onychomycosis     Umbilicated papule    Erythematous-base heme-crusted tan verrucoid plaque consistent with inflamed seborrheic keratosis     Erythematous Silvery Scaling Plaque c/w Psoriasis     See annotation            [] Data reviewed  [] Independent review of test  [] Management discussed with another provider    Assessment / Plan:      Pathology Orders:     Normal Orders This Visit    Specimen to Pathology, Dermatology     Questions:    Procedure Type: Dermatology and skin neoplasms    Number of Specimens: 2    ------------------------: -------------------------    Spec 1 Procedure: Biopsy    Spec 1 Clinical Impression: r/o NMSC    Spec 1 Source: right shoulder    ------------------------: -------------------------    Spec 2 Procedure: Biopsy    Spec 2 Clinical Impression: junctional nevus, r/o atypia    Spec 2 Source: left breast    Release to patient: Immediate        History of nonmelanoma skin cancer  Area(s) of previous NMSC evaluated with no signs of recurrence.    Upper body skin examination performed today including at least 6 points as noted in physical examination. Suspicious lesions noted.    Recommend daily sun protection/avoidance and use of at least SPF 30, broad spectrum sunscreen (OTC drug).     Neoplasm of uncertain behavior of skin  -     Specimen to Pathology, Dermatology  Shave biopsy procedure note:    Shave biopsy performed after verbal consent including risk of infection, scar, recurrence, need for additional treatment of site. Area prepped with alcohol, anesthetized with approximately 1.0cc of 1%  lidocaine with epinephrine. Lesional tissues x 2 shaved with dermablade. Hemostasis achieved with application of aluminum chloride. No complications. Dressing applied. Wound care explained.    Seborrheic keratoses  These are benign inherited growths without a malignant potential. Reassurance given to patient. No treatment is necessary.     Solar lentigo  This is a benign hyperpigmented sun induced lesion. Recommend daily sun protection/avoidance and use of at least SPF 30, broad spectrum sunscreen (OTC drug) will reduce the number of new lesions. Treatment of these benign lesions are considered cosmetic.    Actinic keratoses  Cryosurgery Procedure Note    Verbal consent from the patient is obtained including, but not limited to, risk of hypopigmentation/hyperpigmentation, scar, recurrence of lesion. The patient is aware of the precancerous quality and need for treatment of these lesions. Liquid nitrogen cryosurgery is applied to the 7 actinic keratoses, as detailed in the physical exam, to produce a freeze injury. The patient is aware that blisters may form and is instructed on wound care with gentle cleansing and use of vaseline ointment to keep moist until healed. The patient is supplied a handout on cryosurgery and is instructed to call if lesions do not completely resolve.             LOS NUMBER AND COMPLEXITY OF PROBLEMS    COMPLEXITY OF DATA RISK TOTAL TIME (m)   08225  10123 [] 1 self-limited or minor problem [x] Minimal to none [] No treatment recommended or patient to monitor 15-29  10-19   98052  00042 Low  [] 2 or > self limited or minor problems  [] 1 stable chronic illness  [] 1 acute, uncomplicated illness or injury Limited (2)  [] Prior external notes from each unique source  [] Review result of each unique test  [] Order each unique test [x]  Low  OTC medications, minor skin biopsy 30-44 20-29   91092  83656 Moderate  []  1 or > chronic illness with progression, exacerbation or SE of treatment  [x]   2 or more stable chronic illnesses  []  1 acute illness with systemic symptoms  []  1 acute complicated injury  []  1 undiagnosed new problem with uncertain prognosis Moderate (1/3 below)  []  3 or more data items        *Now includes assessment requiring independent historian  []  Independent interpretation of a test  []  Discuss management/test with another provider Moderate  []  Prescription drug mgmt  []  Minor surgery with risk discussed  []  Mgmt limited by social determinates 45-59  30-39   59537  40900 High  []  1 or more chronic illness with severe exacerbation, progression or SE of treatment  []  1 acute or chronic illness/injury that poses a threat to life or bodily function Extensive (2/3 below)  []  3 or more data items        *Now includes assessment requiring independent historian.  []  Independent interpretation of a test  []  Discuss management/test with another provider High  []  Major surgery with risk discussed  []  Drug therapy requiring intensive monitoring for toxicity  []  Hospitalization  []  Decision for DNR 60-74  40-54      Follow up in about 3 months (around 4/30/2022).    Aylin Chahal MD, FAAD  [facility]

## 2022-02-04 LAB
FINAL PATHOLOGIC DIAGNOSIS: NORMAL
GROSS: NORMAL
Lab: NORMAL
MICROSCOPIC EXAM: NORMAL

## 2022-02-11 ENCOUNTER — PROCEDURE VISIT (OUTPATIENT)
Dept: DERMATOLOGY | Facility: CLINIC | Age: 67
End: 2022-02-11
Payer: MEDICARE

## 2022-02-11 DIAGNOSIS — C44.519 BASAL CELL CARCINOMA (BCC) OF SKIN OF OTHER PART OF TORSO: Primary | ICD-10-CM

## 2022-02-11 PROCEDURE — 88305 TISSUE EXAM BY PATHOLOGIST: CPT | Performed by: PATHOLOGY

## 2022-02-11 PROCEDURE — 99499 NO LOS: ICD-10-PCS | Mod: S$PBB,,, | Performed by: STUDENT IN AN ORGANIZED HEALTH CARE EDUCATION/TRAINING PROGRAM

## 2022-02-11 PROCEDURE — 88305 TISSUE EXAM BY PATHOLOGIST: ICD-10-PCS | Mod: 26,,, | Performed by: PATHOLOGY

## 2022-02-11 PROCEDURE — 11602 EXC TR-EXT MAL+MARG 1.1-2 CM: CPT | Mod: PBBFAC | Performed by: STUDENT IN AN ORGANIZED HEALTH CARE EDUCATION/TRAINING PROGRAM

## 2022-02-11 PROCEDURE — 11602 EXC TR-EXT MAL+MARG 1.1-2 CM: CPT | Mod: S$PBB,51,79, | Performed by: STUDENT IN AN ORGANIZED HEALTH CARE EDUCATION/TRAINING PROGRAM

## 2022-02-11 PROCEDURE — 12032 INTMD RPR S/A/T/EXT 2.6-7.5: CPT | Mod: S$PBB,79,, | Performed by: STUDENT IN AN ORGANIZED HEALTH CARE EDUCATION/TRAINING PROGRAM

## 2022-02-11 PROCEDURE — 12032 PR LAYR CLOS WND TRUNK,ARM,LEG 2.6-7.5 CM: ICD-10-PCS | Mod: S$PBB,79,, | Performed by: STUDENT IN AN ORGANIZED HEALTH CARE EDUCATION/TRAINING PROGRAM

## 2022-02-11 PROCEDURE — 99499 UNLISTED E&M SERVICE: CPT | Mod: S$PBB,,, | Performed by: STUDENT IN AN ORGANIZED HEALTH CARE EDUCATION/TRAINING PROGRAM

## 2022-02-11 PROCEDURE — 12032 INTMD RPR S/A/T/EXT 2.6-7.5: CPT | Mod: PBBFAC,79 | Performed by: STUDENT IN AN ORGANIZED HEALTH CARE EDUCATION/TRAINING PROGRAM

## 2022-02-11 PROCEDURE — 88305 TISSUE EXAM BY PATHOLOGIST: CPT | Mod: 26,,, | Performed by: PATHOLOGY

## 2022-02-11 PROCEDURE — 11602 PR EXC SKIN MALIG 1.1-2 CM TRUNK,ARM,LEG: ICD-10-PCS | Mod: S$PBB,51,79, | Performed by: STUDENT IN AN ORGANIZED HEALTH CARE EDUCATION/TRAINING PROGRAM

## 2022-02-11 NOTE — PROGRESS NOTES
Patient Information  Name: Kishore Sosa  : 1955  MRN: [id number]     Referring Physician:  Dr. Ma ref. provider found   Primary Care Physician:   Primary Doctor Francesca   Date of Visit: 2022      Subjective:       Kishore Sosa is a 66 y.o. male who presents for BCC, here for E&S.    HPI  Dermatologic Surgery preoperative checklist:    Pacemaker/Defibrillator:  none    Anticoagulants:  none    Implants requiring prophylaxis:  none    Any other conditions affecting surgery:  none    Patient was last seen:2022     Prior notes by myself reviewed.   Clinical documentation obtained by nursing staff reviewed.    Review of Systems   Skin: Negative for itching and rash.        Objective:    Physical Exam   Constitutional: He appears well-developed and well-nourished. No distress.   Neurological: He is alert and oriented to person, place, and time. He is not disoriented.   Psychiatric: He has a normal mood and affect.   Skin:   Areas Examined (abnormalities noted in diagram):   Neck Inspection Performed  Chest / Axilla Inspection Performed              Diagram Legend     Erythematous scaling macule/papule c/w actinic keratosis       Vascular papule c/w angioma      Pigmented verrucoid papule/plaque c/w seborrheic keratosis      Yellow umbilicated papule c/w sebaceous hyperplasia      Irregularly shaped tan macule c/w lentigo     1-2 mm smooth white papules consistent with Milia      Movable subcutaneous cyst with punctum c/w epidermal inclusion cyst      Subcutaneous movable cyst c/w pilar cyst      Firm pink to brown papule c/w dermatofibroma      Pedunculated fleshy papule(s) c/w skin tag(s)      Evenly pigmented macule c/w junctional nevus     Mildly variegated pigmented, slightly irregular-bordered macule c/w mildly atypical nevus      Flesh colored to evenly pigmented papule c/w intradermal nevus       Pink pearly papule/plaque c/w basal cell carcinoma      Erythematous hyperkeratotic cursted  plaque c/w SCC      Surgical scar with no sign of skin cancer recurrence      Open and closed comedones      Inflammatory papules and pustules      Verrucoid papule consistent consistent with wart     Erythematous eczematous patches and plaques     Dystrophic onycholytic nail with subungual debris c/w onychomycosis     Umbilicated papule    Erythematous-base heme-crusted tan verrucoid plaque consistent with inflamed seborrheic keratosis     Erythematous Silvery Scaling Plaque c/w Psoriasis     See annotation      No images are attached to the encounter or orders placed in the encounter.    [] Data reviewed  [] Independent review of test  [] Management discussed with another provider    Assessment / Plan:      Pathology Orders:     Normal Orders This Visit    Specimen to Pathology, Dermatology     Questions:    Procedure Type: Dermatology and skin neoplasms    Number of Specimens: 1    ------------------------: -------------------------    Spec 1 Procedure: Excision >2cm    Spec 1 Clinical Impression: biopsy-proven BCC, please check margins    Spec 1 Source: right shoulder    Release to patient: Immediate        Basal cell carcinoma (BCC) of skin of other part of torso  -     Specimen to Pathology, Dermatology  PROCEDURE: Elliptical excision with intermediate layered repair in order to decrease dead space, decrease tension, and close large gap.    ANESTHETIC: 6 cc 1% Xylocaine with Epinephrine 1:100,000, buffered    SURGEON: Aylin Chahal M.D.    ASSISTANTS: Cass Davis MA    PREOPERATIVE DIAGNOSIS:  Biopsy-proven Basal Cell Carcinoma    POSTOPERATIVE DIAGNOSIS:  Same as preoperative diagnosis    PATHOLOGIC DIAGNOSIS: Pending    LOCATION: right shoulder    INITIAL LESION SIZE: 1 cm    EXCISED DIAMETER: 1.8 cm    PREPARATION: The diagnosis, procedure, alternatives, benefits and risks, including but not limited to: infection, bleeding/bruising, drug reactions, pain, scar or cosmetic defect, local sensation  disturbances, wound dehiscence (separation of wound edges after sutures removed) and/or recurrence of present condition were explained to the patient. The patient elected to proceed.  Patient's identity was verified using 2 patient identifiers and the side and site was verified.  Time out period with surgeon, assistant and patient in surgical suite was taken.    PROCEDURE: The location noted above was prepped, draped, and anesthetized in the usual sterile fashion per Aylin Chahal MD. Lesional tissue was carefully marked with at least 4 mm margins of clinically normal skin in all directions. A fusiform elliptical excision was done with #15 blade carried down completely through the dermis into the deep subcutaneous tissues to the level of the non-muscle fascia, and dissection was carried out in that plane.  Electrocoagulation was used to obtain hemostasis. Blood loss was minimal. The wound was then approximated in a layered fashion with subcutaneous and intradermal sutures of 4.0 Monocryl, approximately 3 in number, and the wound was then superficially closed with running sutures of 4.0 Prolene.    The patient tolerated the procedure well.    The area was cleaned and dressed appropriately and the patient was given wound care instructions, as well as an appointment for follow-up evaluation.    LENGTH OF REPAIR: 6 cm             LOS NUMBER AND COMPLEXITY OF PROBLEMS    COMPLEXITY OF DATA RISK TOTAL TIME (m)   45466  32310 [] 1 self-limited or minor problem [] Minimal to none [] No treatment recommended or patient to monitor 15-29  10-19   12485  36732 Low  [] 2 or > self limited or minor problems  [] 1 stable chronic illness  [] 1 acute, uncomplicated illness or injury Limited (2)  [] Prior external notes from each unique source  [] Review result of each unique test  [] Order each unique test []  Low  OTC medications, minor skin biopsy 30-44 20-29   39991  42713 Moderate  []  1 or > chronic illness with progression,  exacerbation or SE of treatment  []  2 or more stable chronic illnesses  []  1 acute illness with systemic symptoms  []  1 acute complicated injury  []  1 undiagnosed new problem with uncertain prognosis Moderate (1/3 below)  []  3 or more data items        *Now includes assessment requiring independent historian  []  Independent interpretation of a test  []  Discuss management/test with another provider Moderate  []  Prescription drug mgmt  []  Minor surgery with risk discussed  []  Mgmt limited by social determinates 45-59  30-39   65047  26493 High  []  1 or more chronic illness with severe exacerbation, progression or SE of treatment  []  1 acute or chronic illness/injury that poses a threat to life or bodily function Extensive (2/3 below)  []  3 or more data items        *Now includes assessment requiring independent historian.  []  Independent interpretation of a test  []  Discuss management/test with another provider High  []  Major surgery with risk discussed  []  Drug therapy requiring intensive monitoring for toxicity  []  Hospitalization  []  Decision for DNR 60-74  40-54      Follow up in about 2 weeks (around 2/25/2022).    Aylin Chahal MD, FAAD  [facility]

## 2022-02-11 NOTE — PATIENT INSTRUCTIONS

## 2022-02-17 LAB
FINAL PATHOLOGIC DIAGNOSIS: NORMAL
GROSS: NORMAL
Lab: NORMAL
MICROSCOPIC EXAM: NORMAL

## 2022-02-21 ENCOUNTER — CLINICAL SUPPORT (OUTPATIENT)
Dept: DERMATOLOGY | Facility: CLINIC | Age: 67
End: 2022-02-21
Payer: MEDICARE

## 2022-02-21 PROCEDURE — 99024 PR POST-OP FOLLOW-UP VISIT: ICD-10-PCS | Mod: POP,,, | Performed by: STUDENT IN AN ORGANIZED HEALTH CARE EDUCATION/TRAINING PROGRAM

## 2022-02-21 PROCEDURE — 99024 POSTOP FOLLOW-UP VISIT: CPT | Mod: POP,,, | Performed by: STUDENT IN AN ORGANIZED HEALTH CARE EDUCATION/TRAINING PROGRAM

## 2022-04-18 ENCOUNTER — TELEPHONE (OUTPATIENT)
Dept: DERMATOLOGY | Facility: CLINIC | Age: 67
End: 2022-04-18
Payer: MEDICARE

## 2022-04-25 ENCOUNTER — OFFICE VISIT (OUTPATIENT)
Dept: DERMATOLOGY | Facility: CLINIC | Age: 67
End: 2022-04-25
Payer: MEDICARE

## 2022-04-25 DIAGNOSIS — Z85.828 HISTORY OF NONMELANOMA SKIN CANCER: Primary | ICD-10-CM

## 2022-04-25 DIAGNOSIS — L82.1 SEBORRHEIC KERATOSES: ICD-10-CM

## 2022-04-25 DIAGNOSIS — L81.4 SOLAR LENTIGO: ICD-10-CM

## 2022-04-25 DIAGNOSIS — L57.0 ACTINIC KERATOSES: ICD-10-CM

## 2022-04-25 PROCEDURE — 17004 DESTROY PREMAL LESIONS 15/>: CPT | Mod: S$PBB,,, | Performed by: STUDENT IN AN ORGANIZED HEALTH CARE EDUCATION/TRAINING PROGRAM

## 2022-04-25 PROCEDURE — 99214 PR OFFICE/OUTPT VISIT, EST, LEVL IV, 30-39 MIN: ICD-10-PCS | Mod: 25,S$PBB,, | Performed by: STUDENT IN AN ORGANIZED HEALTH CARE EDUCATION/TRAINING PROGRAM

## 2022-04-25 PROCEDURE — 99214 OFFICE O/P EST MOD 30 MIN: CPT | Mod: 25,S$PBB,, | Performed by: STUDENT IN AN ORGANIZED HEALTH CARE EDUCATION/TRAINING PROGRAM

## 2022-04-25 PROCEDURE — 99999 PR PBB SHADOW E&M-EST. PATIENT-LVL II: ICD-10-PCS | Mod: PBBFAC,,, | Performed by: STUDENT IN AN ORGANIZED HEALTH CARE EDUCATION/TRAINING PROGRAM

## 2022-04-25 PROCEDURE — 99212 OFFICE O/P EST SF 10 MIN: CPT | Mod: PBBFAC,PO,25 | Performed by: STUDENT IN AN ORGANIZED HEALTH CARE EDUCATION/TRAINING PROGRAM

## 2022-04-25 PROCEDURE — 99999 PR PBB SHADOW E&M-EST. PATIENT-LVL II: CPT | Mod: PBBFAC,,, | Performed by: STUDENT IN AN ORGANIZED HEALTH CARE EDUCATION/TRAINING PROGRAM

## 2022-04-25 PROCEDURE — 17004 DESTROY PREMAL LESIONS 15/>: CPT | Mod: PBBFAC,PO | Performed by: STUDENT IN AN ORGANIZED HEALTH CARE EDUCATION/TRAINING PROGRAM

## 2022-04-25 PROCEDURE — 17004 PR DESTRUCTION, PREMALIGNANT LESIONS; 15 OR MORE LESIONS: ICD-10-PCS | Mod: S$PBB,,, | Performed by: STUDENT IN AN ORGANIZED HEALTH CARE EDUCATION/TRAINING PROGRAM

## 2022-04-25 NOTE — PATIENT INSTRUCTIONS

## 2022-04-25 NOTE — PROGRESS NOTES
Patient Information  Name: Kishore Sosa  : 1955  MRN: [id number]     Referring Physician:  Dr. Ma ref. provider found   Primary Care Physician:   Primary Doctor Francesca   Date of Visit: 2022      Subjective:       Kishore Sosa is a 66 y.o. male who presents for   Chief Complaint   Patient presents with    Skin Check     Ubse. New lesions on arms. X weeks. Sx none      HPI  Patient with hx of NMSC, here for follow up. This is a high risk patient here to check for the development of new lesions.    Patient was last seen:Visit date not found     Prior notes by myself reviewed.   Clinical documentation obtained by nursing staff reviewed.    Review of Systems   Skin: Negative for itching and rash.        Objective:    Physical Exam   Constitutional: He appears well-developed and well-nourished. No distress.   Neurological: He is alert and oriented to person, place, and time. He is not disoriented.   Psychiatric: He has a normal mood and affect.   Skin:   Areas Examined (abnormalities noted in diagram):   Head / Face Inspection Performed  Neck Inspection Performed  Chest / Axilla Inspection Performed  Back Inspection Performed  RUE Inspected  LUE Inspection Performed                   Diagram Legend     Erythematous scaling macule/papule c/w actinic keratosis       Vascular papule c/w angioma      Pigmented verrucoid papule/plaque c/w seborrheic keratosis      Yellow umbilicated papule c/w sebaceous hyperplasia      Irregularly shaped tan macule c/w lentigo     1-2 mm smooth white papules consistent with Milia      Movable subcutaneous cyst with punctum c/w epidermal inclusion cyst      Subcutaneous movable cyst c/w pilar cyst      Firm pink to brown papule c/w dermatofibroma      Pedunculated fleshy papule(s) c/w skin tag(s)      Evenly pigmented macule c/w junctional nevus     Mildly variegated pigmented, slightly irregular-bordered macule c/w mildly atypical nevus      Flesh colored to evenly  pigmented papule c/w intradermal nevus       Pink pearly papule/plaque c/w basal cell carcinoma      Erythematous hyperkeratotic cursted plaque c/w SCC      Surgical scar with no sign of skin cancer recurrence      Open and closed comedones      Inflammatory papules and pustules      Verrucoid papule consistent consistent with wart     Erythematous eczematous patches and plaques     Dystrophic onycholytic nail with subungual debris c/w onychomycosis     Umbilicated papule    Erythematous-base heme-crusted tan verrucoid plaque consistent with inflamed seborrheic keratosis     Erythematous Silvery Scaling Plaque c/w Psoriasis     See annotation      No images are attached to the encounter or orders placed in the encounter.    [] Data reviewed  [] Independent review of test  [] Management discussed with another provider    Assessment / Plan:        History of nonmelanoma skin cancer  Area(s) of previous NMSC evaluated with no signs of recurrence.    Upper body skin examination performed today including at least 6 points as noted in physical examination. No lesions suspicious for malignancy noted.    Recommend daily sun protection/avoidance and use of at least SPF 30, broad spectrum sunscreen (OTC drug).     Seborrheic keratoses  These are benign inherited growths without a malignant potential. Reassurance given to patient. No treatment is necessary.     Solar lentigo  This is a benign hyperpigmented sun induced lesion. Recommend daily sun protection/avoidance and use of at least SPF 30, broad spectrum sunscreen (OTC drug) will reduce the number of new lesions. Treatment of these benign lesions are considered cosmetic.    Actinic keratoses  Cryosurgery Procedure Note    Verbal consent from the patient is obtained including, but not limited to, risk of hypopigmentation/hyperpigmentation, scar, recurrence of lesion. The patient is aware of the precancerous quality and need for treatment of these lesions. Liquid nitrogen  cryosurgery is applied to the 15 actinic keratoses, as detailed in the physical exam, to produce a freeze injury. The patient is aware that blisters may form and is instructed on wound care with gentle cleansing and use of vaseline ointment to keep moist until healed. The patient is supplied a handout on cryosurgery and is instructed to call if lesions do not completely resolve.  - Recommend using 5FU on bilateral arms x 4 weeks           LOS NUMBER AND COMPLEXITY OF PROBLEMS    COMPLEXITY OF DATA RISK TOTAL TIME (m)   77867  23799 [] 1 self-limited or minor problem [x] Minimal to none [] No treatment recommended or patient to monitor 15-29  10-19   52165  73253 Low  [] 2 or > self limited or minor problems  [] 1 stable chronic illness  [] 1 acute, uncomplicated illness or injury Limited (2)  [] Prior external notes from each unique source  [] Review result of each unique test  [] Order each unique test []  Low  OTC medications, minor skin biopsy 30-44  20-29   99661  54909 Moderate  []  1 or > chronic illness with progression, exacerbation or SE of treatment  [x]  2 or more stable chronic illnesses  []  1 acute illness with systemic symptoms  []  1 acute complicated injury  []  1 undiagnosed new problem with uncertain prognosis Moderate (1/3 below)  []  3 or more data items        *Now includes assessment requiring independent historian  []  Independent interpretation of a test  []  Discuss management/test with another provider Moderate  [x]  Prescription drug mgmt  []  Minor surgery with risk discussed  []  Mgmt limited by social determinates 45-59  30-39   70791  36542 High  []  1 or more chronic illness with severe exacerbation, progression or SE of treatment  []  1 acute or chronic illness/injury that poses a threat to life or bodily function Extensive (2/3 below)  []  3 or more data items        *Now includes assessment requiring independent historian.  []  Independent interpretation of a test  []  Discuss  management/test with another provider High  []  Major surgery with risk discussed  []  Drug therapy requiring intensive monitoring for toxicity  []  Hospitalization  []  Decision for DNR 60-74  40-54      Follow up in about 3 months (around 7/25/2022).    Aylin Chahal MD, FAAD  [facility]

## 2022-10-24 ENCOUNTER — OFFICE VISIT (OUTPATIENT)
Dept: PRIMARY CARE CLINIC | Facility: CLINIC | Age: 67
End: 2022-10-24
Payer: MEDICARE

## 2022-10-24 VITALS
HEART RATE: 78 BPM | DIASTOLIC BLOOD PRESSURE: 74 MMHG | OXYGEN SATURATION: 97 % | BODY MASS INDEX: 27.01 KG/M2 | HEIGHT: 69 IN | WEIGHT: 182.38 LBS | SYSTOLIC BLOOD PRESSURE: 134 MMHG | TEMPERATURE: 98 F

## 2022-10-24 DIAGNOSIS — F41.8 MIXED ANXIETY AND DEPRESSIVE DISORDER: ICD-10-CM

## 2022-10-24 DIAGNOSIS — Z85.828 HISTORY OF BASAL CELL CARCINOMA (BCC) EXCISION: ICD-10-CM

## 2022-10-24 DIAGNOSIS — M25.562 CHRONIC PAIN OF LEFT KNEE: ICD-10-CM

## 2022-10-24 DIAGNOSIS — Z98.890 HISTORY OF BASAL CELL CARCINOMA (BCC) EXCISION: ICD-10-CM

## 2022-10-24 DIAGNOSIS — Z13.1 SCREENING FOR DIABETES MELLITUS: ICD-10-CM

## 2022-10-24 DIAGNOSIS — Z12.5 PROSTATE CANCER SCREENING: ICD-10-CM

## 2022-10-24 DIAGNOSIS — R03.0 BLOOD PRESSURE ELEVATED WITHOUT HISTORY OF HTN: ICD-10-CM

## 2022-10-24 DIAGNOSIS — G89.29 CHRONIC PAIN OF LEFT KNEE: ICD-10-CM

## 2022-10-24 DIAGNOSIS — F10.21 HISTORY OF ALCOHOL DEPENDENCE: ICD-10-CM

## 2022-10-24 DIAGNOSIS — E66.3 OVERWEIGHT (BMI 25.0-29.9): Primary | ICD-10-CM

## 2022-10-24 DIAGNOSIS — F33.1 MODERATE EPISODE OF RECURRENT MAJOR DEPRESSIVE DISORDER: ICD-10-CM

## 2022-10-24 DIAGNOSIS — Z13.29 SCREENING FOR HYPOTHYROIDISM: ICD-10-CM

## 2022-10-24 DIAGNOSIS — M79.10 MYALGIA: ICD-10-CM

## 2022-10-24 DIAGNOSIS — R73.9 HYPERGLYCEMIA: ICD-10-CM

## 2022-10-24 LAB
ALBUMIN SERPL BCP-MCNC: 4.6 G/DL (ref 3.5–5.2)
ALP SERPL-CCNC: 69 U/L (ref 55–135)
ALT SERPL W/O P-5'-P-CCNC: 26 U/L (ref 10–44)
ANION GAP SERPL CALC-SCNC: 13 MMOL/L (ref 8–16)
AST SERPL-CCNC: 35 U/L (ref 10–40)
BASOPHILS # BLD AUTO: 0.07 K/UL (ref 0–0.2)
BASOPHILS NFR BLD: 1 % (ref 0–1.9)
BILIRUB SERPL-MCNC: 1.1 MG/DL (ref 0.1–1)
BUN SERPL-MCNC: 22 MG/DL (ref 8–23)
CALCIUM SERPL-MCNC: 10 MG/DL (ref 8.7–10.5)
CHLORIDE SERPL-SCNC: 107 MMOL/L (ref 95–110)
CHOLEST SERPL-MCNC: 256 MG/DL (ref 120–199)
CHOLEST/HDLC SERPL: 4.8 {RATIO} (ref 2–5)
CO2 SERPL-SCNC: 24 MMOL/L (ref 23–29)
COMPLEXED PSA SERPL-MCNC: 1.1 NG/ML (ref 0–4)
CREAT SERPL-MCNC: 1 MG/DL (ref 0.5–1.4)
DIFFERENTIAL METHOD: NORMAL
EOSINOPHIL # BLD AUTO: 0.2 K/UL (ref 0–0.5)
EOSINOPHIL NFR BLD: 2.1 % (ref 0–8)
ERYTHROCYTE [DISTWIDTH] IN BLOOD BY AUTOMATED COUNT: 13.6 % (ref 11.5–14.5)
EST. GFR  (NO RACE VARIABLE): >60 ML/MIN/1.73 M^2
ESTIMATED AVG GLUCOSE: 103 MG/DL (ref 68–131)
GLUCOSE SERPL-MCNC: 95 MG/DL (ref 70–110)
HBA1C MFR BLD: 5.2 % (ref 4–5.6)
HCT VFR BLD AUTO: 45.8 % (ref 40–54)
HDLC SERPL-MCNC: 53 MG/DL (ref 40–75)
HDLC SERPL: 20.7 % (ref 20–50)
HGB BLD-MCNC: 15.6 G/DL (ref 14–18)
IMM GRANULOCYTES # BLD AUTO: 0.03 K/UL (ref 0–0.04)
IMM GRANULOCYTES NFR BLD AUTO: 0.4 % (ref 0–0.5)
LDLC SERPL CALC-MCNC: 180.4 MG/DL (ref 63–159)
LYMPHOCYTES # BLD AUTO: 1.6 K/UL (ref 1–4.8)
LYMPHOCYTES NFR BLD: 23.2 % (ref 18–48)
MAGNESIUM SERPL-MCNC: 2.1 MG/DL (ref 1.6–2.6)
MCH RBC QN AUTO: 29.8 PG (ref 27–31)
MCHC RBC AUTO-ENTMCNC: 34.1 G/DL (ref 32–36)
MCV RBC AUTO: 88 FL (ref 82–98)
MONOCYTES # BLD AUTO: 0.6 K/UL (ref 0.3–1)
MONOCYTES NFR BLD: 8.2 % (ref 4–15)
NEUTROPHILS # BLD AUTO: 4.5 K/UL (ref 1.8–7.7)
NEUTROPHILS NFR BLD: 65.1 % (ref 38–73)
NONHDLC SERPL-MCNC: 203 MG/DL
NRBC BLD-RTO: 0 /100 WBC
PLATELET # BLD AUTO: 316 K/UL (ref 150–450)
PMV BLD AUTO: 10.5 FL (ref 9.2–12.9)
POTASSIUM SERPL-SCNC: 4.6 MMOL/L (ref 3.5–5.1)
PROT SERPL-MCNC: 7.8 G/DL (ref 6–8.4)
RBC # BLD AUTO: 5.23 M/UL (ref 4.6–6.2)
SODIUM SERPL-SCNC: 144 MMOL/L (ref 136–145)
TRIGL SERPL-MCNC: 113 MG/DL (ref 30–150)
TSH SERPL DL<=0.005 MIU/L-ACNC: 1.72 UIU/ML (ref 0.4–4)
WBC # BLD AUTO: 6.98 K/UL (ref 3.9–12.7)

## 2022-10-24 PROCEDURE — 80053 COMPREHEN METABOLIC PANEL: CPT | Performed by: INTERNAL MEDICINE

## 2022-10-24 PROCEDURE — 80061 LIPID PANEL: CPT | Performed by: INTERNAL MEDICINE

## 2022-10-24 PROCEDURE — 99205 PR OFFICE/OUTPT VISIT, NEW, LEVL V, 60-74 MIN: ICD-10-PCS | Mod: S$PBB,,, | Performed by: INTERNAL MEDICINE

## 2022-10-24 PROCEDURE — 84153 ASSAY OF PSA TOTAL: CPT | Performed by: INTERNAL MEDICINE

## 2022-10-24 PROCEDURE — 83735 ASSAY OF MAGNESIUM: CPT | Performed by: INTERNAL MEDICINE

## 2022-10-24 PROCEDURE — 83036 HEMOGLOBIN GLYCOSYLATED A1C: CPT | Performed by: INTERNAL MEDICINE

## 2022-10-24 PROCEDURE — 99999 PR PBB SHADOW E&M-EST. PATIENT-LVL III: ICD-10-PCS | Mod: PBBFAC,,, | Performed by: INTERNAL MEDICINE

## 2022-10-24 PROCEDURE — 99205 OFFICE O/P NEW HI 60 MIN: CPT | Mod: S$PBB,,, | Performed by: INTERNAL MEDICINE

## 2022-10-24 PROCEDURE — 99213 OFFICE O/P EST LOW 20 MIN: CPT | Mod: PBBFAC,PN | Performed by: INTERNAL MEDICINE

## 2022-10-24 PROCEDURE — 85025 COMPLETE CBC W/AUTO DIFF WBC: CPT | Performed by: INTERNAL MEDICINE

## 2022-10-24 PROCEDURE — 84443 ASSAY THYROID STIM HORMONE: CPT | Performed by: INTERNAL MEDICINE

## 2022-10-24 PROCEDURE — 99999 PR PBB SHADOW E&M-EST. PATIENT-LVL III: CPT | Mod: PBBFAC,,, | Performed by: INTERNAL MEDICINE

## 2022-10-24 RX ORDER — DIPHENHYDRAMINE HCL 12.5MG/5ML
LIQUID (ML) ORAL NIGHTLY
COMMUNITY
End: 2022-12-20

## 2022-10-24 NOTE — PATIENT INSTRUCTIONS
Lab work today    Advise limit use of sedating anti-histamines    Cont daily movement - physical activity    Referral to be made to Dalia Kelsey LCSW

## 2022-10-25 ENCOUNTER — PATIENT MESSAGE (OUTPATIENT)
Dept: PRIMARY CARE CLINIC | Facility: CLINIC | Age: 67
End: 2022-10-25
Payer: MEDICARE

## 2022-10-28 ENCOUNTER — PATIENT MESSAGE (OUTPATIENT)
Dept: ADMINISTRATIVE | Facility: HOSPITAL | Age: 67
End: 2022-10-28
Payer: MEDICARE

## 2022-10-28 DIAGNOSIS — Z12.11 SCREENING FOR COLON CANCER: ICD-10-CM

## 2022-11-12 PROBLEM — F41.8 MIXED ANXIETY AND DEPRESSIVE DISORDER: Chronic | Status: ACTIVE | Noted: 2019-10-21

## 2022-11-12 PROBLEM — F10.21 HISTORY OF ALCOHOL DEPENDENCE: Chronic | Status: ACTIVE | Noted: 2022-10-24

## 2022-11-12 PROBLEM — F10.21 HISTORY OF ALCOHOL DEPENDENCE: Status: ACTIVE | Noted: 2022-11-12

## 2022-11-12 PROBLEM — Z98.890 HISTORY OF BASAL CELL CARCINOMA (BCC) EXCISION: Status: ACTIVE | Noted: 2022-10-24

## 2022-11-12 PROBLEM — Z85.828 HISTORY OF BASAL CELL CARCINOMA (BCC) EXCISION: Status: ACTIVE | Noted: 2022-10-24

## 2022-11-12 PROBLEM — F33.1 MODERATE EPISODE OF RECURRENT MAJOR DEPRESSIVE DISORDER: Chronic | Status: ACTIVE | Noted: 2022-11-12

## 2022-11-12 PROBLEM — F33.1 MODERATE EPISODE OF RECURRENT MAJOR DEPRESSIVE DISORDER: Status: ACTIVE | Noted: 2022-11-12

## 2022-11-12 PROBLEM — F33.1 MODERATE EPISODE OF RECURRENT MAJOR DEPRESSIVE DISORDER: Chronic | Status: ACTIVE | Noted: 2022-10-24

## 2022-11-12 PROBLEM — R03.0 BLOOD PRESSURE ELEVATED WITHOUT HISTORY OF HTN: Status: ACTIVE | Noted: 2022-10-24

## 2022-11-12 NOTE — ASSESSMENT & PLAN NOTE
Reports has been hurting less recently - cont encourage maintain daily physical activity as tolerated

## 2022-11-12 NOTE — ASSESSMENT & PLAN NOTE
No current Rx - cont current management strategies and can discuss others - he is open to working w therapist

## 2022-11-12 NOTE — ASSESSMENT & PLAN NOTE
BP good here today - discussed referral to digital medicine but does not yet have diagnosis of HTN - cont monitor - cont encourage daily physical activity - watch sodium

## 2022-11-18 ENCOUNTER — OFFICE VISIT (OUTPATIENT)
Dept: DERMATOLOGY | Facility: CLINIC | Age: 67
End: 2022-11-18
Payer: MEDICARE

## 2022-11-18 DIAGNOSIS — D48.5 NEOPLASM OF UNCERTAIN BEHAVIOR OF SKIN: Primary | ICD-10-CM

## 2022-11-18 DIAGNOSIS — L57.0 ACTINIC KERATOSES: ICD-10-CM

## 2022-11-18 PROCEDURE — 88305 TISSUE EXAM BY PATHOLOGIST: CPT | Mod: 26,,, | Performed by: PATHOLOGY

## 2022-11-18 PROCEDURE — 99499 UNLISTED E&M SERVICE: CPT | Mod: S$PBB,,, | Performed by: STUDENT IN AN ORGANIZED HEALTH CARE EDUCATION/TRAINING PROGRAM

## 2022-11-18 PROCEDURE — 11102 PR TANGENTIAL BIOPSY, SKIN, SINGLE LESION: ICD-10-PCS | Mod: S$PBB,,, | Performed by: STUDENT IN AN ORGANIZED HEALTH CARE EDUCATION/TRAINING PROGRAM

## 2022-11-18 PROCEDURE — 88305 TISSUE EXAM BY PATHOLOGIST: CPT | Performed by: PATHOLOGY

## 2022-11-18 PROCEDURE — 99999 PR PBB SHADOW E&M-EST. PATIENT-LVL III: ICD-10-PCS | Mod: PBBFAC,,, | Performed by: STUDENT IN AN ORGANIZED HEALTH CARE EDUCATION/TRAINING PROGRAM

## 2022-11-18 PROCEDURE — 99499 NO LOS: ICD-10-PCS | Mod: S$PBB,,, | Performed by: STUDENT IN AN ORGANIZED HEALTH CARE EDUCATION/TRAINING PROGRAM

## 2022-11-18 PROCEDURE — 17003 DESTRUCT PREMALG LES 2-14: CPT | Mod: S$PBB,,, | Performed by: STUDENT IN AN ORGANIZED HEALTH CARE EDUCATION/TRAINING PROGRAM

## 2022-11-18 PROCEDURE — 88305 TISSUE EXAM BY PATHOLOGIST: ICD-10-PCS | Mod: 26,,, | Performed by: PATHOLOGY

## 2022-11-18 PROCEDURE — 88342 IMHCHEM/IMCYTCHM 1ST ANTB: CPT | Mod: 91 | Performed by: PATHOLOGY

## 2022-11-18 PROCEDURE — 11102 TANGNTL BX SKIN SINGLE LES: CPT | Mod: S$PBB,,, | Performed by: STUDENT IN AN ORGANIZED HEALTH CARE EDUCATION/TRAINING PROGRAM

## 2022-11-18 PROCEDURE — 88342 IMHCHEM/IMCYTCHM 1ST ANTB: CPT | Performed by: PATHOLOGY

## 2022-11-18 PROCEDURE — 17003 DESTRUCTION, PREMALIGNANT LESIONS; SECOND THROUGH 14 LESIONS: ICD-10-PCS | Mod: S$PBB,,, | Performed by: STUDENT IN AN ORGANIZED HEALTH CARE EDUCATION/TRAINING PROGRAM

## 2022-11-18 PROCEDURE — 17000 PR DESTRUCTION(LASER SURGERY,CRYOSURGERY,CHEMOSURGERY),PREMALIGNANT LESIONS,FIRST LESION: ICD-10-PCS | Mod: S$PBB,XS,, | Performed by: STUDENT IN AN ORGANIZED HEALTH CARE EDUCATION/TRAINING PROGRAM

## 2022-11-18 PROCEDURE — 99999 PR PBB SHADOW E&M-EST. PATIENT-LVL III: CPT | Mod: PBBFAC,,, | Performed by: STUDENT IN AN ORGANIZED HEALTH CARE EDUCATION/TRAINING PROGRAM

## 2022-11-18 PROCEDURE — 17000 DESTRUCT PREMALG LESION: CPT | Mod: S$PBB,XS,, | Performed by: STUDENT IN AN ORGANIZED HEALTH CARE EDUCATION/TRAINING PROGRAM

## 2022-11-18 PROCEDURE — 88341 IMHCHEM/IMCYTCHM EA ADD ANTB: CPT | Performed by: PATHOLOGY

## 2022-11-18 PROCEDURE — 88341 PR IHC OR ICC EACH ADD'L SINGLE ANTIBODY  STAINPR: ICD-10-PCS | Mod: 26,,, | Performed by: PATHOLOGY

## 2022-11-18 PROCEDURE — 88342 IMHCHEM/IMCYTCHM 1ST ANTB: CPT | Mod: 26,,, | Performed by: PATHOLOGY

## 2022-11-18 PROCEDURE — 88341 IMHCHEM/IMCYTCHM EA ADD ANTB: CPT | Mod: 26,,, | Performed by: PATHOLOGY

## 2022-11-18 PROCEDURE — 17000 DESTRUCT PREMALG LESION: CPT | Mod: PBBFAC,PO | Performed by: STUDENT IN AN ORGANIZED HEALTH CARE EDUCATION/TRAINING PROGRAM

## 2022-11-18 PROCEDURE — 99213 OFFICE O/P EST LOW 20 MIN: CPT | Mod: PBBFAC,PO | Performed by: STUDENT IN AN ORGANIZED HEALTH CARE EDUCATION/TRAINING PROGRAM

## 2022-11-18 PROCEDURE — 11102 TANGNTL BX SKIN SINGLE LES: CPT | Mod: PBBFAC,PO | Performed by: STUDENT IN AN ORGANIZED HEALTH CARE EDUCATION/TRAINING PROGRAM

## 2022-11-18 PROCEDURE — 88342 CHG IMMUNOCYTOCHEMISTRY: ICD-10-PCS | Mod: 26,,, | Performed by: PATHOLOGY

## 2022-11-18 PROCEDURE — 17003 DESTRUCT PREMALG LES 2-14: CPT | Mod: PBBFAC,PO | Performed by: STUDENT IN AN ORGANIZED HEALTH CARE EDUCATION/TRAINING PROGRAM

## 2022-11-18 NOTE — PROGRESS NOTES
Patient Information  Name: Kishore Sosa  : 1955  MRN: [id number]     Referring Physician:  Dr. Ma ref. provider found   Primary Care Physician:  Dr. Deanna Sebastian MD   Date of Visit: 2022      Subjective:       Kishore Sosa is a 67 y.o. male who presents for   Chief Complaint   Patient presents with    Skin Check     Lesion on ear. Nose. Ubse      HPI  Patient with new complaint of lesion(s)  Location: ear, nose  Duration: 2 months  Symptoms: scaly, nonhealing, bleeds  Relieving factors/Previous treatments: none    Patient was last seen:Visit date not found     Prior notes by myself reviewed.   Clinical documentation obtained by nursing staff reviewed.    Review of Systems   Skin:  Negative for itching and rash.      Objective:    Physical Exam   Constitutional: He appears well-developed and well-nourished. No distress.   Neurological: He is alert and oriented to person, place, and time. He is not disoriented.   Psychiatric: He has a normal mood and affect.   Skin:   Areas Examined (abnormalities noted in diagram):   Head / Face Inspection Performed  Neck Inspection Performed  RUE Inspected  LUE Inspection Performed                 Diagram Legend     Erythematous scaling macule/papule c/w actinic keratosis       Vascular papule c/w angioma      Pigmented verrucoid papule/plaque c/w seborrheic keratosis      Yellow umbilicated papule c/w sebaceous hyperplasia      Irregularly shaped tan macule c/w lentigo     1-2 mm smooth white papules consistent with Milia      Movable subcutaneous cyst with punctum c/w epidermal inclusion cyst      Subcutaneous movable cyst c/w pilar cyst      Firm pink to brown papule c/w dermatofibroma      Pedunculated fleshy papule(s) c/w skin tag(s)      Evenly pigmented macule c/w junctional nevus     Mildly variegated pigmented, slightly irregular-bordered macule c/w mildly atypical nevus      Flesh colored to evenly pigmented papule c/w intradermal nevus        Pink pearly papule/plaque c/w basal cell carcinoma      Erythematous hyperkeratotic cursted plaque c/w SCC      Surgical scar with no sign of skin cancer recurrence      Open and closed comedones      Inflammatory papules and pustules      Verrucoid papule consistent consistent with wart     Erythematous eczematous patches and plaques     Dystrophic onycholytic nail with subungual debris c/w onychomycosis     Umbilicated papule    Erythematous-base heme-crusted tan verrucoid plaque consistent with inflamed seborrheic keratosis     Erythematous Silvery Scaling Plaque c/w Psoriasis     See annotation          [] Data reviewed  [] Independent review of test  [] Management discussed with another provider    Assessment / Plan:      Pathology Orders:       Normal Orders This Visit    Specimen to Pathology, Dermatology     Questions:    Procedure Type: Dermatology and skin neoplasms    Number of Specimens: 1    ------------------------: -------------------------    Spec 1 Procedure: Biopsy    Spec 1 Clinical Impression: r/o NMSC    Spec 1 Source: left nasal sidewall    Release to patient: Immediate          Neoplasm of uncertain behavior of skin  -     Specimen to Pathology, Dermatology  Shave biopsy procedure note:    Shave biopsy performed after verbal consent including risk of infection, scar, recurrence, need for additional treatment of site. Area prepped with alcohol, anesthetized with approximately 1.0cc of 1% lidocaine with epinephrine. Lesional tissue shaved with dermablade. Hemostasis achieved with application of aluminum chloride. No complications. Dressing applied. Wound care explained.    Actinic keratoses  Cryosurgery Procedure Note    Verbal consent from the patient is obtained including, but not limited to, risk of hypopigmentation/hyperpigmentation, scar, recurrence of lesion. The patient is aware of the precancerous quality and need for treatment of these lesions. Liquid nitrogen cryosurgery is applied to  the 8 actinic keratoses, as detailed in the physical exam, to produce a freeze injury. The patient is aware that blisters may form and is instructed on wound care with gentle cleansing and use of vaseline ointment to keep moist until healed. The patient is supplied a handout on cryosurgery and is instructed to call if lesions do not completely resolve.           LOS NUMBER AND COMPLEXITY OF PROBLEMS    COMPLEXITY OF DATA RISK TOTAL TIME (m)   18688  49549 [] 1 self-limited or minor problem [] Minimal to none [] No treatment recommended or patient to monitor 15-29  10-19   47781  20249 Low  [] 2 or > self limited or minor problems  [] 1 stable chronic illness  [] 1 acute, uncomplicated illness or injury Limited (2)  [] Prior external notes from each unique source  [] Review result of each unique test  [] Order each unique test []  Low  OTC medications, minor skin biopsy 30-44  20-29   58021  64166 Moderate  []  1 or > chronic illness with progression, exacerbation or SE of treatment  []  2 or more stable chronic illnesses  []  1 acute illness with systemic symptoms  []  1 acute complicated injury  []  1 undiagnosed new problem with uncertain prognosis Moderate (1/3 below)  []  3 or more data items        *Now includes assessment requiring independent historian  []  Independent interpretation of a test  []  Discuss management/test with another provider Moderate  []  Prescription drug mgmt  []  Minor surgery with risk discussed  []  Mgmt limited by social determinates 45-59  30-39   28709  03481 High  []  1 or more chronic illness with severe exacerbation, progression or SE of treatment  []  1 acute or chronic illness/injury that poses a threat to life or bodily function Extensive (2/3 below)  []  3 or more data items        *Now includes assessment requiring independent historian.  []  Independent interpretation of a test  []  Discuss management/test with another provider High  []  Major surgery with risk  discussed  []  Drug therapy requiring intensive monitoring for toxicity  []  Hospitalization  []  Decision for DNR 60-74  40-54      No follow-ups on file.    Aylin Chahal MD, FAAD  [facility]

## 2022-11-18 NOTE — PATIENT INSTRUCTIONS
CRYOSURGERY      Your doctor has used a method called cryosurgery to treat your skin condition. Cryosurgery refers to the use of very cold substances to treat a variety of skin conditions such as warts, pre-skin cancers, molluscum contagiosum, sun spots, and several benign growths. The substance we use in cryosurgery is liquid nitrogen and is so cold (-195 degrees Celsius) that is burns when administered.     Following treatment in the office, the skin may immediately burn and become red. You may find the area around the lesion is affected as well. It is sometimes necessary to treat not only the lesion, but a small area of the surrounding normal skin to achieve a good response.     A blister, and even a blood filled blister, may form after treatment.   This is a normal response. If the blister is painful, it is acceptable to sterilize a needle and with rubbing alcohol and gently pop the blister. It is important that you gently wash the area with soap and warm water as the blister fluid may contain wart virus if a wart was treated. Do no remove the roof of the blister.     The area treated can take anywhere from 1-3 weeks to heal. Healing time depends on the kind skin lesion treated, the location, and how aggressively the lesion was treated. It is recommended that the areas treated are covered with Vaseline or bacitracin ointment and a band-aid. If a band-aid is not practical, just ointment applied several times per day will do. Keeping these areas moist will speed the healing time.    Treatment with liquid nitrogen can leave a scar. In dark skin, it may be a light or dark scar, in light skin it may be a white or pink scar. These will generally fade with time, but may never go away completely.     If you have any concerns after your treatment, please feel free to call the office.       [address] [facility] [place] [address]/ [phone] FAX/ [email or web address]  Shave Biopsy Wound Care    Your  doctor has performed a shave biopsy today.  A band aid and vaseline ointment has been placed over the site.  This should remain in place for NO LONGER THAN 48 hours.  It is fine to remove the bandaid after 24 hours, if the area is no longer bleeding. It is recommended that you keep the area dry (do not wet)) for the first 24 hours.  After 24 hours, wash the area with warm soap and water and apply Vaseline jelly.  Many patients prefer to use Neosporin or Bacitracin ointment.  This is acceptable; however, know that you can develop an allergy to this medication even if you have used it safely for years.  It is important to keep the area moist.  Letting it dry out and get air slows healing time, and will worsen the scar.        If you notice increasing redness, tenderness, pain, or yellow drainage at the biopsy site, please notify your doctor.  These are signs of an infection.    If your biopsy site is bleeding, apply firm pressure for 15 minutes straight.  Repeat for another 15 minutes, if it is still bleeding.   If the surgical site continues to bleed, then please contact your doctor.      For [facility] users:   You will receive your biopsy results in [facility] as soon as they are available. Please be assured that your physician/provider will review your results and will then determine what further treatment, evaluation, or planning is required. You should be contacted by your physician's/provider's office within 5 business days of receiving your results; If not, please reach out to directly. This is one more way [facility] is putting you first.     [facility] [place] [address]/ [phone] FAX/ [email or web address]

## 2022-11-21 ENCOUNTER — TELEPHONE (OUTPATIENT)
Dept: PRIMARY CARE CLINIC | Facility: CLINIC | Age: 67
End: 2022-11-21
Payer: MEDICARE

## 2022-11-21 ENCOUNTER — PATIENT MESSAGE (OUTPATIENT)
Dept: PRIMARY CARE CLINIC | Facility: CLINIC | Age: 67
End: 2022-11-21
Payer: MEDICARE

## 2022-11-21 NOTE — TELEPHONE ENCOUNTER
Left message to call clinic back in regards to rescheduling his 11/22 appointment with Dr. Sebastian.

## 2022-12-16 LAB
COMMENT: NORMAL
FINAL PATHOLOGIC DIAGNOSIS: NORMAL
GROSS: NORMAL
Lab: NORMAL
MICROSCOPIC EXAM: NORMAL

## 2022-12-19 ENCOUNTER — OFFICE VISIT (OUTPATIENT)
Dept: PRIMARY CARE CLINIC | Facility: CLINIC | Age: 67
End: 2022-12-19
Payer: MEDICARE

## 2022-12-19 VITALS
TEMPERATURE: 98 F | HEART RATE: 64 BPM | WEIGHT: 186.81 LBS | BODY MASS INDEX: 27.59 KG/M2 | DIASTOLIC BLOOD PRESSURE: 68 MMHG | SYSTOLIC BLOOD PRESSURE: 110 MMHG | OXYGEN SATURATION: 98 %

## 2022-12-19 DIAGNOSIS — F33.1 MODERATE EPISODE OF RECURRENT MAJOR DEPRESSIVE DISORDER: Chronic | ICD-10-CM

## 2022-12-19 DIAGNOSIS — Z13.6 ENCOUNTER FOR ABDOMINAL AORTIC ANEURYSM (AAA) SCREENING: ICD-10-CM

## 2022-12-19 DIAGNOSIS — F41.8 MIXED ANXIETY AND DEPRESSIVE DISORDER: Chronic | ICD-10-CM

## 2022-12-19 DIAGNOSIS — Z85.828 HISTORY OF BASAL CELL CARCINOMA (BCC) EXCISION: ICD-10-CM

## 2022-12-19 DIAGNOSIS — R03.0 BLOOD PRESSURE ELEVATED WITHOUT HISTORY OF HTN: ICD-10-CM

## 2022-12-19 DIAGNOSIS — Z11.59 ENCOUNTER FOR HEPATITIS C SCREENING TEST FOR LOW RISK PATIENT: ICD-10-CM

## 2022-12-19 DIAGNOSIS — Z98.890 HISTORY OF BASAL CELL CARCINOMA (BCC) EXCISION: ICD-10-CM

## 2022-12-19 DIAGNOSIS — Z00.00 ENCOUNTER FOR PREVENTIVE HEALTH EXAMINATION: Primary | ICD-10-CM

## 2022-12-19 PROCEDURE — 90694 VACC AIIV4 NO PRSRV 0.5ML IM: CPT | Mod: PBBFAC,PN

## 2022-12-19 PROCEDURE — G0438 PPPS, INITIAL VISIT: HCPCS | Mod: ,,, | Performed by: NURSE PRACTITIONER

## 2022-12-19 PROCEDURE — G0008 ADMIN INFLUENZA VIRUS VAC: HCPCS | Mod: PBBFAC,PN

## 2022-12-19 PROCEDURE — G0438 PR WELCOME MEDICARE ANNUAL WELLNESS INITIAL VISIT: ICD-10-PCS | Mod: ,,, | Performed by: NURSE PRACTITIONER

## 2022-12-19 PROCEDURE — 99999 PR PBB SHADOW E&M-EST. PATIENT-LVL III: ICD-10-PCS | Mod: PBBFAC,,, | Performed by: NURSE PRACTITIONER

## 2022-12-19 PROCEDURE — 99999 PR PBB SHADOW E&M-EST. PATIENT-LVL III: CPT | Mod: PBBFAC,,, | Performed by: NURSE PRACTITIONER

## 2022-12-19 PROCEDURE — 99213 OFFICE O/P EST LOW 20 MIN: CPT | Mod: PBBFAC,PN,25 | Performed by: NURSE PRACTITIONER

## 2022-12-19 RX ORDER — MELATONIN 3 MG
CAPSULE ORAL
COMMUNITY
End: 2024-03-07

## 2022-12-19 NOTE — PATIENT INSTRUCTIONS
Counseling and Referral of Other Preventative  (Italic type indicates deductible and co-insurance are waived)    Patient Name: Kishore Sosa  Today's Date: 12/19/2022    Health Maintenance         Date Due Completion Date    Hepatitis C Screening Never done ---    COVID-19 Vaccine (1) Never done ---    Pneumococcal Vaccines (Age 65+) (1 - PCV) Never done ---    TETANUS VACCINE Never done ---    Shingles Vaccine (1 of 2) Never done ---    Colorectal Cancer Screening Never done ---    Abdominal Aortic Aneurysm Screening Never done ---    Influenza Vaccine (1) 09/01/2022 11/1/2021    Override on 8/24/2019: Done    PROSTATE-SPECIFIC ANTIGEN 10/24/2023 10/24/2022    Lipid Panel 10/24/2027 10/24/2022          No orders of the defined types were placed in this encounter.    Try Pepcid (famotidine) 20 mg twice daily OR omeprazole 20 mg once daily for 2-4 weeks to help with epigastric pain. Reduce use of ibuprofen.     Try to wear knee brace at bedtime to see if this reduces knee pain.     Contact pharmacy at [facility] or [facility] to schedule COVID booster.             The following information is provided to all patients.  This information is to help you find resources for any of the problems found today that may be affecting your health:                Living healthy guide: www.[facility].[place].gov      Understanding Diabetes: www.diabetes.org      Eating healthy: www.cdc.gov/healthyweight      CDC home safety checklist: www.cdc.gov/steadi/patient.html      Agency on Aging: www.goea.[place].gov      Alcoholics anonymous (AA): www.aa.org      Physical Activity: www.harini.nih.gov/fj5nexc      Tobacco use: www.quitwithusla.org

## 2022-12-19 NOTE — PROGRESS NOTES
Kishore Sosa presented for an initial Medicare AWV today. The following components were reviewed and updated:    Medical history  Family History  Social history  Allergies and Current Medications  Health Risk Assessment  Health Maintenance  Care Team    **See Completed Assessments for Annual Wellness visit with in the encounter summary    The following assessments were completed:  Depression Screening  Cognitive function Screening  Timed Get Up Test  Whisper Test  Nutrition Screening  PAQ      Requests flu shot. Remote smoker of pipe, quit 20 years.     Occasional lower abdominal pain, attributes to eating nuts/peanut butter. Last episode one month. No associated sx. Nocturia x 1. No dysuria. No constipation/diarrhea. Still needs to do FIT test. Relieved by NSAIDs/ASA. Lasts a day or more. Occasional intermittent epigastric pain with meals. Worse 6-9 months ago, gradually improving. Occurs while jogging, not severe. Hasn't taken OTCs. Takes ibuprofen 600 mg nightly to help sleep - knee pain.       Advance Care Planning   Has advanced directive. He will bring a copy for his chart.             Vitals:    12/19/22 1102   BP: 110/68   BP Location: Right arm   Patient Position: Sitting   BP Method: Medium (Manual)   Pulse: 64   Temp: 98.1 °F (36.7 °C)   TempSrc: Oral   SpO2: 98%   Weight: 84.7 kg (186 lb 12.8 oz)     Body mass index is 27.59 kg/m².         Review of Systems   Constitutional:  Negative for activity change, appetite change, fatigue and unexpected weight change.   HENT:  Negative for dental problem.    Respiratory:  Negative for cough, chest tightness and shortness of breath.    Cardiovascular:  Negative for chest pain, palpitations and leg swelling.   Gastrointestinal:  Negative for abdominal pain, change in bowel habit, nausea, vomiting, reflux and change in bowel habit.   Genitourinary:  Negative for difficulty urinating and dysuria.   Musculoskeletal:  Negative for arthralgias and gait problem.    Neurological:  Negative for dizziness.    Physical Exam  Constitutional:       General: He is not in acute distress.     Appearance: He is not ill-appearing.   HENT:      Head: Normocephalic.      Right Ear: Tympanic membrane normal.      Left Ear: Tympanic membrane normal.      Nose: Nose normal.      Mouth/Throat:      Mouth: Mucous membranes are moist.      Pharynx: No oropharyngeal exudate or posterior oropharyngeal erythema.   Eyes:      General: No scleral icterus.  Cardiovascular:      Rate and Rhythm: Normal rate and regular rhythm.      Heart sounds: Normal heart sounds.   Pulmonary:      Effort: No respiratory distress.      Breath sounds: Normal breath sounds.   Abdominal:      General: There is no distension.      Palpations: Abdomen is soft.      Tenderness: There is no abdominal tenderness.   Musculoskeletal:         General: No swelling.   Skin:     General: Skin is warm and dry.   Neurological:      General: No focal deficit present.      Mental Status: He is alert and oriented to person, place, and time. Mental status is at baseline.      Cranial Nerves: No cranial nerve deficit.      Gait: Gait normal.   Psychiatric:         Mood and Affect: Mood normal.         Behavior: Behavior normal.          Health Maintenance         Date Due Completion Date    Hepatitis C Screening Never done ---    COVID-19 Vaccine (1) Never done ---    Pneumococcal Vaccines (Age 65+) (1 - PCV) Never done ---    TETANUS VACCINE Never done ---    Shingles Vaccine (1 of 2) Never done ---    Colorectal Cancer Screening Never done ---    Abdominal Aortic Aneurysm Screening Never done ---    Influenza Vaccine (1) 09/01/2022 11/1/2021    Override on 8/24/2019: Done    PROSTATE-SPECIFIC ANTIGEN 10/24/2023 10/24/2022    Lipid Panel 10/24/2027 10/24/2022            Diagnoses and health risks identified today and associated recommendations/orders:  Mixed anxiety and depressive disorder  Mood is stable. Continue POC.    Moderate  episode of recurrent major depressive disorder  Mood is stable.   Continue POC.    Blood pressure elevated without history of HTN  BP Readings from Last 3 Encounters:   12/19/22 110/68   10/24/22 134/74   01/13/22 (!) 161/89   Well-controlled. Monitor.      History of basal cell carcinoma (BCC) excision  Followed closely by dermatology.        Problem List Items Addressed This Visit          Psychiatric    Moderate episode of recurrent major depressive disorder (Chronic)     Mood is stable.   Continue POC.         Mixed anxiety and depressive disorder (Chronic)     Mood is stable. Continue POC.            Cardiac/Vascular    Blood pressure elevated without history of HTN     BP Readings from Last 3 Encounters:   12/19/22 110/68   10/24/22 134/74   01/13/22 (!) 161/89   Well-controlled. Monitor.              Oncology    History of basal cell carcinoma (BCC) excision     Followed closely by dermatology.           Other Visit Diagnoses       Encounter for preventive health examination    -  Primary    Encounter for abdominal aortic aneurysm (AAA) screening        Relevant Orders    CV AAA Screening    US Abdominal Aorta    Encounter for hepatitis C screening test for low risk patient        Relevant Orders    Hepatitis C Antibody            Provided Kishore UP with a 5-10 year written screening schedule and personal prevention plan. Recommendations were developed using the USPSTF age appropriate recommendations. Education, counseling, and referrals were provided as needed.  After Visit Summary printed and given to patient which includes a list of additional screenings\tests needed.    Follow up if symptoms worsen or fail to improve.      DOUGLAS Jackson FNP-GUSTAVO offered to discuss advanced care planning, including how to pick a person who would make decisions for you if you were unable to make them for yourself, called a health care power of , and what kind of decisions you might make such  as use of life sustaining treatments such as ventilators and tube feeding when faced with a life limiting illness recorded on a living will that they will need to know. (How you want to be cared for as you near the end of your natural life)     X Patient is interested in learning more about how to make advanced directives.  I provided them paperwork and offered to discuss this with them.

## 2022-12-20 NOTE — ASSESSMENT & PLAN NOTE
BP Readings from Last 3 Encounters:   12/19/22 110/68   10/24/22 134/74   01/13/22 (!) 161/89   Well-controlled. Monitor.

## 2023-01-09 ENCOUNTER — PATIENT MESSAGE (OUTPATIENT)
Dept: PRIMARY CARE CLINIC | Facility: CLINIC | Age: 68
End: 2023-01-09
Payer: MEDICARE

## 2023-01-12 ENCOUNTER — HOSPITAL ENCOUNTER (OUTPATIENT)
Dept: RADIOLOGY | Facility: HOSPITAL | Age: 68
Discharge: HOME | End: 2023-01-12
Attending: NURSE PRACTITIONER
Payer: MEDICARE

## 2023-01-12 DIAGNOSIS — Z13.6 ENCOUNTER FOR ABDOMINAL AORTIC ANEURYSM (AAA) SCREENING: ICD-10-CM

## 2023-01-12 PROCEDURE — 76775 US EXAM ABDO BACK WALL LIM: CPT | Mod: 26,,, | Performed by: RADIOLOGY

## 2023-01-12 PROCEDURE — 76775 US EXAM ABDO BACK WALL LIM: CPT | Mod: TC

## 2023-01-12 PROCEDURE — 76775 US ABDOMINAL AORTA: ICD-10-PCS | Mod: 26,,, | Performed by: RADIOLOGY

## 2023-03-17 ENCOUNTER — PATIENT MESSAGE (OUTPATIENT)
Dept: RESEARCH | Facility: HOSPITAL | Age: 68
End: 2023-03-17
Payer: MEDICARE

## 2023-04-27 ENCOUNTER — PATIENT MESSAGE (OUTPATIENT)
Dept: ADMINISTRATIVE | Facility: HOSPITAL | Age: 68
End: 2023-04-27
Payer: MEDICARE

## 2023-11-20 ENCOUNTER — TELEPHONE (OUTPATIENT)
Dept: PRIMARY CARE CLINIC | Facility: CLINIC | Age: 68
End: 2023-11-20
Payer: MEDICARE

## 2023-11-22 ENCOUNTER — TELEPHONE (OUTPATIENT)
Dept: PRIMARY CARE CLINIC | Facility: CLINIC | Age: 68
End: 2023-11-22

## 2023-11-27 ENCOUNTER — TELEPHONE (OUTPATIENT)
Dept: PRIMARY CARE CLINIC | Facility: CLINIC | Age: 68
End: 2023-11-27
Payer: MEDICARE

## 2023-12-07 ENCOUNTER — HOSPITAL ENCOUNTER (OUTPATIENT)
Dept: RADIOLOGY | Facility: HOSPITAL | Age: 68
Discharge: HOME | End: 2023-12-07
Attending: NURSE PRACTITIONER
Payer: MEDICARE

## 2023-12-07 ENCOUNTER — OFFICE VISIT (OUTPATIENT)
Dept: PRIMARY CARE CLINIC | Facility: CLINIC | Age: 68
End: 2023-12-07
Payer: MEDICARE

## 2023-12-07 VITALS
TEMPERATURE: 98 F | WEIGHT: 180.31 LBS | SYSTOLIC BLOOD PRESSURE: 122 MMHG | DIASTOLIC BLOOD PRESSURE: 80 MMHG | BODY MASS INDEX: 26.71 KG/M2 | OXYGEN SATURATION: 98 % | HEART RATE: 60 BPM | HEIGHT: 69 IN

## 2023-12-07 DIAGNOSIS — E66.3 OVERWEIGHT (BMI 25.0-29.9): ICD-10-CM

## 2023-12-07 DIAGNOSIS — Z86.711 HISTORY OF PULMONARY EMBOLISM: ICD-10-CM

## 2023-12-07 DIAGNOSIS — R53.83 FATIGUE, UNSPECIFIED TYPE: ICD-10-CM

## 2023-12-07 DIAGNOSIS — E61.1 IRON DEFICIENCY: ICD-10-CM

## 2023-12-07 DIAGNOSIS — J45.909 CHILDHOOD ASTHMA WITHOUT COMPLICATION, UNSPECIFIED ASTHMA SEVERITY, UNSPECIFIED WHETHER PERSISTENT: ICD-10-CM

## 2023-12-07 DIAGNOSIS — F10.21 HISTORY OF ALCOHOL DEPENDENCE: ICD-10-CM

## 2023-12-07 DIAGNOSIS — F33.1 MODERATE EPISODE OF RECURRENT MAJOR DEPRESSIVE DISORDER: Chronic | ICD-10-CM

## 2023-12-07 DIAGNOSIS — R03.0 BLOOD PRESSURE ELEVATED WITHOUT HISTORY OF HTN: ICD-10-CM

## 2023-12-07 DIAGNOSIS — E78.5 DYSLIPIDEMIA: Primary | ICD-10-CM

## 2023-12-07 DIAGNOSIS — R07.9 CHEST PAIN, UNSPECIFIED TYPE: ICD-10-CM

## 2023-12-07 DIAGNOSIS — R63.5 WEIGHT GAIN: ICD-10-CM

## 2023-12-07 DIAGNOSIS — Z12.5 PROSTATE CANCER SCREENING: ICD-10-CM

## 2023-12-07 DIAGNOSIS — F41.8 MIXED ANXIETY AND DEPRESSIVE DISORDER: Chronic | ICD-10-CM

## 2023-12-07 DIAGNOSIS — L63.0 ALOPECIA (CAPITIS) TOTALIS: ICD-10-CM

## 2023-12-07 PROCEDURE — 84466 ASSAY OF TRANSFERRIN: CPT | Performed by: NURSE PRACTITIONER

## 2023-12-07 PROCEDURE — 99215 OFFICE O/P EST HI 40 MIN: CPT | Mod: S$PBB,,, | Performed by: NURSE PRACTITIONER

## 2023-12-07 PROCEDURE — 84439 ASSAY OF FREE THYROXINE: CPT | Performed by: NURSE PRACTITIONER

## 2023-12-07 PROCEDURE — 36415 COLL VENOUS BLD VENIPUNCTURE: CPT | Performed by: NURSE PRACTITIONER

## 2023-12-07 PROCEDURE — 99999 PR PBB SHADOW E&M-EST. PATIENT-LVL IV: CPT | Mod: PBBFAC,,, | Performed by: NURSE PRACTITIONER

## 2023-12-07 PROCEDURE — 84153 ASSAY OF PSA TOTAL: CPT | Performed by: NURSE PRACTITIONER

## 2023-12-07 PROCEDURE — 71046 XR CHEST PA AND LATERAL: ICD-10-PCS | Mod: 26,,, | Performed by: RADIOLOGY

## 2023-12-07 PROCEDURE — 71046 X-RAY EXAM CHEST 2 VIEWS: CPT | Mod: TC,FY,PO

## 2023-12-07 PROCEDURE — 80061 LIPID PANEL: CPT | Performed by: NURSE PRACTITIONER

## 2023-12-07 PROCEDURE — 99214 OFFICE O/P EST MOD 30 MIN: CPT | Mod: PBBFAC,25,PN | Performed by: NURSE PRACTITIONER

## 2023-12-07 PROCEDURE — 82728 ASSAY OF FERRITIN: CPT | Performed by: NURSE PRACTITIONER

## 2023-12-07 PROCEDURE — 99999 PR PBB SHADOW E&M-EST. PATIENT-LVL IV: ICD-10-PCS | Mod: PBBFAC,,, | Performed by: NURSE PRACTITIONER

## 2023-12-07 PROCEDURE — 71046 X-RAY EXAM CHEST 2 VIEWS: CPT | Mod: 26,,, | Performed by: RADIOLOGY

## 2023-12-07 PROCEDURE — 80053 COMPREHEN METABOLIC PANEL: CPT | Performed by: NURSE PRACTITIONER

## 2023-12-07 PROCEDURE — 99215 PR OFFICE/OUTPT VISIT, EST, LEVL V, 40-54 MIN: ICD-10-PCS | Mod: S$PBB,,, | Performed by: NURSE PRACTITIONER

## 2023-12-07 PROCEDURE — 84443 ASSAY THYROID STIM HORMONE: CPT | Performed by: NURSE PRACTITIONER

## 2023-12-07 PROCEDURE — 85025 COMPLETE CBC W/AUTO DIFF WBC: CPT | Performed by: NURSE PRACTITIONER

## 2023-12-07 PROCEDURE — 84480 ASSAY TRIIODOTHYRONINE (T3): CPT | Performed by: NURSE PRACTITIONER

## 2023-12-07 NOTE — PROGRESS NOTES
"Kishore Sosa  12/07/2023  [id number]    Deanna Sebastian MD  Patient Care Team:  Deanna Sebastian MD as PCP - General (Internal Medicine)  Sari Garcia NP as Nurse Practitioner (Family Medicine)      [facility] Note      Chief Complaint:  Chief Complaint   Patient presents with    Follow-up     Pt states he can't lose any weight, has thinning hair, and being fatigued. Pt also complaining of indigestion.        History of Present Illness:  HPI    Annual follow up.     Hair thinning, difficulty losing weight.   LOV was AWV with me 12/19/22.      Health maintenance overdue. PSA?.    Elevated LDL cholesterol.     Frustrated because he cannot lose weight. Has cut kcal 200-300 per day about 3 months ago. Exercises, runs/jogs, about an hour/day. Noted increased abdominal girth.     Episodic heartburn. Occurs about once per month, less than previous.    Sternal CP, worse with movement/rotating trunk, "catches." Occurs 2-3 times/week. Onset about one year ago. No noted change in pattern. This is not associated with episodes of heartburn.    Started finasteride for sudden hair thinning/loss from online prescriber. Feels more irritable since he is taking this.     , case mgmt, home based.   Remote h/o spontaneous, severe PE 20 years ago. Anticoagulated with Coumadin for 6 months.      PHQ-4 score is a 5.         Review of Systems   Constitutional:  Negative for activity change and appetite change.   HENT:  Negative for trouble swallowing.    Respiratory:  Negative for cough, chest tightness and shortness of breath.    Cardiovascular:  Positive for chest pain.   Endocrine: Negative for polyuria.   Genitourinary:  Negative for difficulty urinating and dysuria.   Musculoskeletal:  Negative for arthralgias.   Neurological:  Negative for dizziness, seizures, syncope, facial asymmetry, speech difficulty, weakness, light-headedness and headaches.         The following were reviewed: Active " problem list, medication list, allergies, family history, social history, and Health Maintenance.       Medications:  Current Outpatient Medications on File Prior to Visit   Medication Sig Dispense Refill    FINASTERIDE ORAL       melatonin 3 mg Cap Take by mouth.       No current facility-administered medications on file prior to visit.       Medications have been reviewed and reconciled with patient at visit today.    Barriers to medications present (no )    Fall since last office visit (no )      Exam:  Vitals:    12/07/23 0836   BP: 122/80   Pulse: 60   Temp: 97.9 °F (36.6 °C)     Weight: 81.8 kg (180 lb 5.4 oz)   Body mass index is 26.63 kg/m².      BP Readings from Last 3 Encounters:   12/07/23 122/80   12/19/22 110/68   10/24/22 134/74     Wt Readings from Last 3 Encounters:   12/07/23 0836 81.8 kg (180 lb 5.4 oz)   12/19/22 1102 84.7 kg (186 lb 12.8 oz)   10/24/22 0820 82.7 kg (182 lb 6.4 oz)            Physical Exam    Laboratory Reviewed: (Yes)  Lab Results   Component Value Date    WBC 6.98 10/24/2022    HGB 15.6 10/24/2022    HCT 45.8 10/24/2022     10/24/2022    CHOL 256 (H) 10/24/2022    TRIG 113 10/24/2022    HDL 53 10/24/2022    ALT 26 10/24/2022    AST 35 10/24/2022     10/24/2022    K 4.6 10/24/2022     10/24/2022    CREATININE 1.0 10/24/2022    BUN 22 10/24/2022    CO2 24 10/24/2022    TSH 1.722 10/24/2022    PSA 1.1 10/24/2022    HGBA1C 5.2 10/24/2022           Health Maintenance  Health Maintenance Topics with due status: Not Due       Topic Last Completion Date    Hemoglobin A1c (Diabetic Prevention Screening) 10/24/2022    Lipid Panel 10/24/2022     Health Maintenance Due   Topic Date Due    COVID-19 Vaccine (1) Never done    Pneumococcal Vaccines (Age 65+) (1 - PCV) Never done    TETANUS VACCINE  Never done    Shingles Vaccine (1 of 2) Never done    Colorectal Cancer Screening  Never done    RSV Vaccine (Age 60+ and Pregnant patients) (1 - 1-dose 60+ series) Never done     Influenza Vaccine (1) 09/01/2023    PROSTATE-SPECIFIC ANTIGEN  10/24/2023             Assessment:  Problem List Items Addressed This Visit          Psychiatric    Moderate episode of recurrent major depressive disorder (Chronic)     Some increased anxiety recently. Referred to psychiatry.         Mixed anxiety and depressive disorder (Chronic)     Some increased anxiety. Referral to psychiatry for med management. H/O significant problems with past tx, apathy.         Relevant Orders    Ambulatory referral/consult to Psychiatry    History of alcohol dependence (Chronic)     Remains in remission.             Pulmonary    Childhood asthma without complication     No recent sx. Monitor.            Cardiac/Vascular    Blood pressure elevated without history of HTN     BP Readings from Last 3 Encounters:   12/07/23 122/80   12/19/22 110/68   10/24/22 134/74   Monitor.             Hematology    History of pulmonary embolism     Remote. No acute sx.             Endocrine    Overweight (BMI 25.0-29.9)     Frustrated with inability to lose weight despite calorie reduction and aerobic exercise.  TSH          Other Visit Diagnoses       Dyslipidemia    -  Primary    Relevant Orders    Lipid Panel    CBC Auto Differential    Comprehensive Metabolic Panel    Prostate cancer screening        Relevant Orders    PSA, SCREENING    Weight gain        Relevant Orders    TSH    T4, Free    T3    Fatigue, unspecified type        Relevant Orders    IRON AND TIBC    FERRITIN    Iron deficiency        Alopecia (capitis) totalis        Relevant Orders    IRON AND TIBC    FERRITIN    Chest pain, unspecified type        Relevant Orders    X-Ray Chest PA And Lateral              Plan:  Dyslipidemia  -     Lipid Panel; Future; Expected date: 12/07/2023  -     CBC Auto Differential; Future; Expected date: 12/07/2023  -     Comprehensive Metabolic Panel; Future; Expected date: 12/07/2023    Prostate cancer screening  -     PSA, SCREENING; Future;  Expected date: 12/07/2023    Weight gain  -     TSH; Future; Expected date: 12/07/2023  -     T4, Free; Future; Expected date: 12/07/2023  -     T3; Future; Expected date: 12/07/2023    Fatigue, unspecified type  -     IRON AND TIBC; Future; Expected date: 12/07/2023  -     FERRITIN; Future; Expected date: 12/07/2023    Iron deficiency    Alopecia (capitis) totalis  -     IRON AND TIBC; Future; Expected date: 12/07/2023  -     FERRITIN; Future; Expected date: 12/07/2023    Chest pain, unspecified type  -     X-Ray Chest PA And Lateral    Mixed anxiety and depressive disorder  -     Ambulatory referral/consult to Psychiatry; Future; Expected date: 12/14/2023    Moderate episode of recurrent major depressive disorder    Childhood asthma without complication, unspecified asthma severity, unspecified whether persistent    Blood pressure elevated without history of HTN    History of pulmonary embolism    Overweight (BMI 25.0-29.9)    History of alcohol dependence      -Patient's lab results were reviewed and discussed with patient  -Treatment options and alternatives were discussed with the patient. Patient expressed understanding. Patient was given the opportunity to ask questions and be an active participant in their medical care. Patient had no further questions or concerns at this time.   -Documentation of patient's health and condition was obtained from family member who was present during visit.  -Patient is an overall moderate risk for health complications from their medical conditions.       Follow up: Follow up in about 3 months (around 3/7/2024).      After visit summary printed and given to patient upon discharge.  Patient goals and care plan are included in After visit summary.    Total medical decision making time was 55 min.  The following issues were discussed: The primary encounter diagnosis was Dyslipidemia. Diagnoses of Prostate cancer screening, Weight gain, Fatigue, unspecified type, Iron deficiency,  Alopecia (capitis) totalis, Chest pain, unspecified type, Mixed anxiety and depressive disorder, Moderate episode of recurrent major depressive disorder, Childhood asthma without complication, unspecified asthma severity, unspecified whether persistent, Blood pressure elevated without history of HTN, History of pulmonary embolism, Overweight (BMI 25.0-29.9), and History of alcohol dependence were also pertinent to this visit.    Health maintenance needs, recent test results and goals of care discussed with pt and questions answered.

## 2023-12-07 NOTE — ASSESSMENT & PLAN NOTE
Some increased anxiety. Referral to psychiatry for med management. H/O significant problems with past tx, apathy.

## 2023-12-07 NOTE — ASSESSMENT & PLAN NOTE
BP Readings from Last 3 Encounters:   12/07/23 122/80   12/19/22 110/68   10/24/22 134/74     Monitor.

## 2023-12-07 NOTE — PATIENT INSTRUCTIONS
If you are feeling unwell, we'd like to be the first ones to know here at [facility]! Please give us a call. Same day appointments are our top priority to keep you well and out of the emergency rooms and hospitals. Call [phone] for our direct line. After hours advice is always available. Please call [phone] after hours to speak to the on-call team.

## 2023-12-08 ENCOUNTER — TELEPHONE (OUTPATIENT)
Dept: PSYCHIATRY | Facility: CLINIC | Age: 68
End: 2023-12-08
Payer: MEDICARE

## 2023-12-08 ENCOUNTER — PATIENT MESSAGE (OUTPATIENT)
Dept: PSYCHIATRY | Facility: CLINIC | Age: 68
End: 2023-12-08
Payer: MEDICARE

## 2023-12-08 LAB
ALBUMIN SERPL BCP-MCNC: 4.2 G/DL (ref 3.5–5.2)
ALP SERPL-CCNC: 66 U/L (ref 55–135)
ALT SERPL W/O P-5'-P-CCNC: 42 U/L (ref 10–44)
ANION GAP SERPL CALC-SCNC: 12 MMOL/L (ref 8–16)
AST SERPL-CCNC: 36 U/L (ref 10–40)
BASOPHILS # BLD AUTO: 0.06 K/UL (ref 0–0.2)
BASOPHILS NFR BLD: 0.8 % (ref 0–1.9)
BILIRUB SERPL-MCNC: 0.7 MG/DL (ref 0.1–1)
BUN SERPL-MCNC: 19 MG/DL (ref 8–23)
CALCIUM SERPL-MCNC: 9.6 MG/DL (ref 8.7–10.5)
CHLORIDE SERPL-SCNC: 105 MMOL/L (ref 95–110)
CHOLEST SERPL-MCNC: 222 MG/DL (ref 120–199)
CHOLEST/HDLC SERPL: 5.2 {RATIO} (ref 2–5)
CO2 SERPL-SCNC: 26 MMOL/L (ref 23–29)
COMPLEXED PSA SERPL-MCNC: 0.87 NG/ML (ref 0–4)
CREAT SERPL-MCNC: 1 MG/DL (ref 0.5–1.4)
DIFFERENTIAL METHOD: ABNORMAL
EOSINOPHIL # BLD AUTO: 0.2 K/UL (ref 0–0.5)
EOSINOPHIL NFR BLD: 2.1 % (ref 0–8)
ERYTHROCYTE [DISTWIDTH] IN BLOOD BY AUTOMATED COUNT: 14.5 % (ref 11.5–14.5)
EST. GFR  (NO RACE VARIABLE): >60 ML/MIN/1.73 M^2
FERRITIN SERPL-MCNC: 29 NG/ML (ref 20–300)
GLUCOSE SERPL-MCNC: 87 MG/DL (ref 70–110)
HCT VFR BLD AUTO: 42.8 % (ref 40–54)
HDLC SERPL-MCNC: 43 MG/DL (ref 40–75)
HDLC SERPL: 19.4 % (ref 20–50)
HGB BLD-MCNC: 15.2 G/DL (ref 14–18)
IMM GRANULOCYTES # BLD AUTO: 0.03 K/UL (ref 0–0.04)
IMM GRANULOCYTES NFR BLD AUTO: 0.4 % (ref 0–0.5)
IRON SERPL-MCNC: 94 UG/DL (ref 45–160)
LDLC SERPL CALC-MCNC: 158.4 MG/DL (ref 63–159)
LYMPHOCYTES # BLD AUTO: 1.7 K/UL (ref 1–4.8)
LYMPHOCYTES NFR BLD: 23 % (ref 18–48)
MCH RBC QN AUTO: 28.6 PG (ref 27–31)
MCHC RBC AUTO-ENTMCNC: 35.5 G/DL (ref 32–36)
MCV RBC AUTO: 81 FL (ref 82–98)
MONOCYTES # BLD AUTO: 0.6 K/UL (ref 0.3–1)
MONOCYTES NFR BLD: 8 % (ref 4–15)
NEUTROPHILS # BLD AUTO: 4.9 K/UL (ref 1.8–7.7)
NEUTROPHILS NFR BLD: 65.7 % (ref 38–73)
NONHDLC SERPL-MCNC: 179 MG/DL
NRBC BLD-RTO: 0 /100 WBC
PLATELET # BLD AUTO: 274 K/UL (ref 150–450)
PMV BLD AUTO: 10.7 FL (ref 9.2–12.9)
POTASSIUM SERPL-SCNC: 4.5 MMOL/L (ref 3.5–5.1)
PROT SERPL-MCNC: 7.5 G/DL (ref 6–8.4)
RBC # BLD AUTO: 5.31 M/UL (ref 4.6–6.2)
SATURATED IRON: 21 % (ref 20–50)
SODIUM SERPL-SCNC: 143 MMOL/L (ref 136–145)
T3 SERPL-MCNC: 103 NG/DL (ref 60–180)
T4 FREE SERPL-MCNC: 0.96 NG/DL (ref 0.71–1.51)
TOTAL IRON BINDING CAPACITY: 457 UG/DL (ref 250–450)
TRANSFERRIN SERPL-MCNC: 309 MG/DL (ref 200–375)
TRIGL SERPL-MCNC: 103 MG/DL (ref 30–150)
TSH SERPL DL<=0.005 MIU/L-ACNC: 1.71 UIU/ML (ref 0.4–4)
WBC # BLD AUTO: 7.49 K/UL (ref 3.9–12.7)

## 2023-12-08 NOTE — TELEPHONE ENCOUNTER
----- Message from Mary Lauren sent at 12/8/2023  4:10 PM CST -----  Patient is requesting a call back at [phone]      
yes

## 2023-12-11 ENCOUNTER — PATIENT MESSAGE (OUTPATIENT)
Dept: PSYCHIATRY | Facility: CLINIC | Age: 68
End: 2023-12-11
Payer: MEDICARE

## 2023-12-12 ENCOUNTER — PATIENT MESSAGE (OUTPATIENT)
Dept: PRIMARY CARE CLINIC | Facility: CLINIC | Age: 68
End: 2023-12-12
Payer: MEDICARE

## 2023-12-12 DIAGNOSIS — E78.5 HYPERLIPIDEMIA, UNSPECIFIED HYPERLIPIDEMIA TYPE: Primary | ICD-10-CM

## 2023-12-12 RX ORDER — ATORVASTATIN CALCIUM 20 MG/1
20 TABLET, FILM COATED ORAL DAILY
Qty: 90 TABLET | Refills: 3 | Status: SHIPPED | OUTPATIENT
Start: 2023-12-12 | End: 2024-03-07 | Stop reason: SDUPTHER

## 2024-02-27 DIAGNOSIS — Z00.00 ENCOUNTER FOR MEDICARE ANNUAL WELLNESS EXAM: ICD-10-CM

## 2024-03-06 ENCOUNTER — HOSPITAL ENCOUNTER (OUTPATIENT)
Dept: RADIOLOGY | Facility: HOSPITAL | Age: 69
Discharge: HOME | End: 2024-03-06
Attending: INTERNAL MEDICINE
Payer: MEDICARE

## 2024-03-06 ENCOUNTER — OFFICE VISIT (OUTPATIENT)
Dept: PRIMARY CARE CLINIC | Facility: CLINIC | Age: 69
End: 2024-03-06
Payer: MEDICARE

## 2024-03-06 VITALS
HEIGHT: 69 IN | BODY MASS INDEX: 26.38 KG/M2 | WEIGHT: 178.13 LBS | DIASTOLIC BLOOD PRESSURE: 80 MMHG | OXYGEN SATURATION: 97 % | TEMPERATURE: 98 F | HEART RATE: 78 BPM | SYSTOLIC BLOOD PRESSURE: 114 MMHG

## 2024-03-06 DIAGNOSIS — G89.29 CHRONIC PAIN OF LEFT KNEE: ICD-10-CM

## 2024-03-06 DIAGNOSIS — Z98.890 HISTORY OF BASAL CELL CARCINOMA (BCC) EXCISION: ICD-10-CM

## 2024-03-06 DIAGNOSIS — M17.12 OSTEOARTHRITIS OF LEFT KNEE, UNSPECIFIED OSTEOARTHRITIS TYPE: ICD-10-CM

## 2024-03-06 DIAGNOSIS — Z12.11 SCREEN FOR COLON CANCER: Primary | ICD-10-CM

## 2024-03-06 DIAGNOSIS — M25.562 CHRONIC PAIN OF LEFT KNEE: ICD-10-CM

## 2024-03-06 DIAGNOSIS — I70.0 AORTIC ATHEROSCLEROSIS: ICD-10-CM

## 2024-03-06 DIAGNOSIS — Z85.828 HISTORY OF BASAL CELL CARCINOMA (BCC) EXCISION: ICD-10-CM

## 2024-03-06 DIAGNOSIS — F10.21 HISTORY OF ALCOHOL DEPENDENCE: ICD-10-CM

## 2024-03-06 DIAGNOSIS — F33.41 MDD (MAJOR DEPRESSIVE DISORDER), RECURRENT, IN PARTIAL REMISSION: Chronic | ICD-10-CM

## 2024-03-06 DIAGNOSIS — E78.5 HYPERLIPIDEMIA, UNSPECIFIED HYPERLIPIDEMIA TYPE: ICD-10-CM

## 2024-03-06 PROCEDURE — 73560 X-RAY EXAM OF KNEE 1 OR 2: CPT | Mod: 59,TC,RT

## 2024-03-06 PROCEDURE — 73562 X-RAY EXAM OF KNEE 3: CPT | Mod: TC,LT

## 2024-03-06 PROCEDURE — 90677 PCV20 VACCINE IM: CPT | Mod: PBBFAC,PN

## 2024-03-06 PROCEDURE — 99999 PR PBB SHADOW E&M-EST. PATIENT-LVL V: CPT | Mod: PBBFAC,,, | Performed by: INTERNAL MEDICINE

## 2024-03-06 PROCEDURE — 99215 OFFICE O/P EST HI 40 MIN: CPT | Mod: PBBFAC,25,PN | Performed by: INTERNAL MEDICINE

## 2024-03-06 PROCEDURE — 99215 OFFICE O/P EST HI 40 MIN: CPT | Mod: S$PBB,,, | Performed by: INTERNAL MEDICINE

## 2024-03-06 PROCEDURE — 73562 X-RAY EXAM OF KNEE 3: CPT | Mod: 26,LT,, | Performed by: RADIOLOGY

## 2024-03-06 PROCEDURE — 99999PBSHW PNEUMOCOCCAL CONJUGATE VACCINE 20-VALENT: Mod: PBBFAC,,,

## 2024-03-06 RX ORDER — DICLOFENAC SODIUM 10 MG/G
4 GEL TOPICAL 4 TIMES DAILY PRN
Qty: 450 G | Refills: 2 | Status: SHIPPED | OUTPATIENT
Start: 2024-03-06 | End: 2024-05-02

## 2024-03-06 NOTE — PROGRESS NOTES
Kishore Sosa  03/06/2024  [id number]    Deanna Sebastian MD  Patient Care Team:  Deanna Sebastian MD as PCP - General (Internal Medicine)  Sari Garcia NP as Nurse Practitioner (Family Medicine)    Visit Type: Follow-up    Chief Complaint:  Chief Complaint   Patient presents with    Follow-up     Three month follow up. Pt is here to go over test results, and complaining of knee pain.      History of Present Illness: Mr. Kishore Sosa is a 68 year old male here for scheduled f/u. Last seen by me Oct 2022 to establish care. Was seen by NP Jose Dec 2023.    Medical issues include anxiety/depression, h/o asthma, h/o PE, h/o basal cell carcinoma, chronic L knee pain.    Watching what he eats - hasn't been running lately due to L knee pain - was walking 5-6 miles but hasn't past 2-3 months.    Reports vision worsening. Wears glasses. Followed by external eye specialist - last encounter 1-2 years ago?    Sleep ok - nocturia 2x night on average.     PHQ-4 Score: 3     From last/initial visit w me 10/24/22  Gained 15 lbs over pandemic and can't lose weight. Running 5-7 miles daily.  H/o alcohol abuse - sober 25 years  H/o antidepressant use - discontinued 13 years ago as they had become ineffective and developed profound apathy which led to loss of home, savings, employment.  Endorses mod depression/anxiety at present.   Using an Apollo device and another hand-held vibratory devise that have helped w management of depression/anxiety.  Home BP's up to 160/60.  ZENA-7 15/21 moderate anxiety/somewhat difficult  PHQ-9 12/27 moderate depression/somewhat difficult    Recent appointments:   12/07/23 [facility] Jose     Upcoming appointments:  Future Appointments       Date Provider Specialty Appt Notes    3/6/2024  Radiology Chronic pain of left knee [M25.562, G89.29]    3/6/2024  Lab Hyperlipidemia, unspecified hyperlipidemia type [E78.5]    4/23/2024 Aylin Chahal MD Dermatology History of basal cell carcinoma  (BCC) excision [Z98.890, Z85.828    5/1/2024 José Baca MD Psychiatry F/U              The following were reviewed: Active problem list, medication list, allergies, family history, social history, and Health Maintenance.     Medications have been reviewed and reconciled with patient at visit today.    Review of Systems   See HPI above    Exam: sat 97%  Vitals:    03/06/24 1118   BP: 114/80   Pulse: 78   Temp: 98.2 °F (36.8 °C)     Weight: 80.8 kg (178 lb 2.1 oz)   Body mass index is 26.31 kg/m².    BP Readings from Last 3 Encounters:   03/06/24 114/80   12/07/23 122/80   12/19/22 110/68      Wt Readings from Last 3 Encounters:   03/06/24 1118 80.8 kg (178 lb 2.1 oz)   12/07/23 0836 81.8 kg (180 lb 5.4 oz)   12/19/22 1102 84.7 kg (186 lb 12.8 oz)      Physical Exam  Vitals reviewed.   Constitutional:       General: He is not in acute distress.     Appearance: Normal appearance. He is not ill-appearing.   HENT:      Head: Normocephalic and atraumatic.      Right Ear: Tympanic membrane, ear canal and external ear normal.      Left Ear: Tympanic membrane, ear canal and external ear normal.      Nose: Nose normal.      Mouth/Throat:      Mouth: Mucous membranes are moist.      Pharynx: Oropharynx is clear.   Eyes:      Extraocular Movements: Extraocular movements intact.      Conjunctiva/sclera: Conjunctivae normal.      Comments: glasses   Neck:      Vascular: No carotid bruit.   Cardiovascular:      Rate and Rhythm: Normal rate and regular rhythm.      Heart sounds: No murmur heard.  Pulmonary:      Effort: Pulmonary effort is normal. No respiratory distress.      Breath sounds: No wheezing, rhonchi or rales.   Abdominal:      General: Bowel sounds are normal.      Palpations: Abdomen is soft.      Tenderness: There is no abdominal tenderness. There is no guarding.   Musculoskeletal:      Right lower leg: No edema.      Left lower leg: No edema.   Lymphadenopathy:      Cervical: No cervical adenopathy.   Skin:      "General: Skin is warm and dry.   Neurological:      Mental Status: He is alert and oriented to person, place, and time. Mental status is at baseline.      Motor: No weakness.      Coordination: Coordination normal.   Psychiatric:         Mood and Affect: Mood normal.         Behavior: Behavior normal.         Thought Content: Thought content normal.        Laboratory Reviewed  Lab Results   Component Value Date    WBC 7.49 12/07/2023    HGB 15.2 12/07/2023    HCT 42.8 12/07/2023     12/07/2023    MCV 81 (L) 12/07/2023    CHOL 152 03/06/2024    TRIG 95 03/06/2024    HDL 42 03/06/2024    LDLCALC 91.0 03/06/2024    ALT 42 12/07/2023    AST 36 12/07/2023     12/07/2023    K 4.5 12/07/2023     12/07/2023    CREATININE 1.0 12/07/2023    BUN 19 12/07/2023    CO2 26 12/07/2023    MG 2.1 10/24/2022    TSH 1.714 12/07/2023    FREET4 0.96 12/07/2023    PSA 0.87 12/07/2023    HGBA1C 5.2 10/24/2022     Lab Results   Component Value Date    CALCIUM 9.6 12/07/2023    No results found for: "XSENWULJ45"No results found for: "FOLATE"   Lab Results   Component Value Date    IRON 94 12/07/2023    TRANSFERRIN 309 12/07/2023    TIBC 457 (H) 12/07/2023    FESATURATED 21 12/07/2023      Lab Results   Component Value Date    EGFRNORACEVR >60.0 12/07/2023    ALBUMIN 4.2 12/07/2023   No results found for: "FTXQBWCY93RL"     US AAA 1/12/23 Aortoiliac atherosclerosis: Minimal. No abdominal aortic aneurysm.    CXR 12/07/23 Heart size normal. Aortic arch calcifications. Lungs clear. Thoracic spondylosis.    Assessment:   68 y.o. male with multiple co-morbid illnesses here for continued follow up of medical problems.      The primary encounter diagnosis was Screen for colon cancer. Diagnoses of Chronic pain of left knee, Osteoarthritis of left knee, unspecified osteoarthritis type, History of basal cell carcinoma (BCC) excision, Hyperlipidemia, unspecified hyperlipidemia type, MDD (major depressive disorder), recurrent, in " partial remission, Aortic atherosclerosis, and History of alcohol dependence were also pertinent to this visit.      Plan:   1. Screen for colon cancer  -     Ambulatory referral/consult to Endo Procedure ; Future; Expected date: 03/07/2024    2. Chronic pain of left knee  -     Cancel: X-Ray Knee 3 View Left; Future; Expected date: 03/06/2024  -     Ambulatory referral/consult to Sports Medicine; Future; Expected date: 03/13/2024    3. Osteoarthritis of left knee, unspecified osteoarthritis type  -     Ambulatory referral/consult to Sports Medicine; Future; Expected date: 03/13/2024    4. History of basal cell carcinoma (BCC) excision  -     Ambulatory referral/consult to Dermatology; Future; Expected date: 03/13/2024    5. Hyperlipidemia, unspecified hyperlipidemia type  -     Lipid Panel; Future; Expected date: 03/06/2024  -     atorvastatin (LIPITOR) 20 MG tablet; Take 1 tablet (20 mg total) by mouth once daily.  Dispense: 90 tablet; Refill: 3    6. MDD (major depressive disorder), recurrent, in partial remission  Assessment & Plan:  Stable - encourage cont healthy nutritional intake - work on moving more, daily natural light exposure, sleep hygiene - f/u psychiatry as scheduled      7. Aortic atherosclerosis  Overview:  US AAA 1/12/23 Aortoiliac atherosclerosis: Minimal. No abdominal aortic aneurysm.    Assessment & Plan:  Cont statin - BP at goal      8. History of alcohol dependence  Assessment & Plan:  Sober > 25 years maintains remission      Other orders  -     diclofenac sodium (VOLTAREN) 1 % Gel; Apply 4 g topically 4 (four) times daily as needed (L knee pain).  Dispense: 450 g; Refill: 2  -     (In Office Administered) Pneumococcal Conjugate Vaccine (20 Valent) (IM) (Preferred)         Health Maintenance         Date Due Completion Date    TETANUS VACCINE Never done ---    Colorectal Cancer Screening Never done ---    Shingles Vaccine (1 of 2) Never done ---    RSV Vaccine (Age 60+ and Pregnant  patients) (1 - 1-dose 60+ series) Never done ---    Influenza Vaccine (1) 09/01/2023 12/19/2022    Override on 8/24/2019: Done    COVID-19 Vaccine (4 - 2023-24 season) 09/01/2023 11/1/2021    PROSTATE-SPECIFIC ANTIGEN 12/07/2024 12/7/2023    Hemoglobin A1c (Diabetic Prevention Screening) 10/24/2025 10/24/2022    Lipid Panel 03/06/2029 3/6/2024          -Patient's lab results were reviewed and discussed with patient  -Treatment options and alternatives were discussed with the patient. Patient expressed understanding. Patient was given the opportunity to ask questions and be an active participant in their medical care. Patient had no further questions or concerns at this time.   -Patient is an overall moderate risk for health complications from their medical conditions.     Follow up: Follow up in about 3 months (around 6/6/2024) for Follow Up.    After visit summary printed and given to patient upon discharge.  Patient care plan included in After visit summary.    TOTAL TIME evaluating and managing this patient for this encounter was greater than 45  minutes. This time was spent personally by me on some of the following activities: review of patient's past medical history, assessing age-appropriate health maintenance needs, review of any interval history, review and interpretation of lab results, review and interpretation of imaging test results, review and interpretation of cardiology test results, reviewing consulting specialist notes, obtaining history from the patient and family, examination of the patient, medication reconciliation, managing and/or ordering prescription medications, ordering imaging tests, ordering referral to subspecialty provider(s), educating patient and answering their questions about diagnosis, treatment plan, and goals of treatment, discussing planned follow-up and final documentation of the visit. This time was exclusive of any separately billable procedures for this patient and exclusive  of time spent treating any other patients.

## 2024-03-07 ENCOUNTER — HOSPITAL ENCOUNTER (OUTPATIENT)
Dept: PREADMISSION TESTING | Facility: HOSPITAL | Age: 69
Discharge: HOME | End: 2024-03-07
Attending: FAMILY MEDICINE
Payer: MEDICARE

## 2024-03-07 ENCOUNTER — TELEPHONE (OUTPATIENT)
Dept: PRIMARY CARE CLINIC | Facility: CLINIC | Age: 69
End: 2024-03-07
Payer: MEDICARE

## 2024-03-07 DIAGNOSIS — Z12.11 SCREEN FOR COLON CANCER: ICD-10-CM

## 2024-03-07 RX ORDER — ATORVASTATIN CALCIUM 20 MG/1
20 TABLET, FILM COATED ORAL DAILY
Qty: 90 TABLET | Refills: 3 | Status: SHIPPED | OUTPATIENT
Start: 2024-03-07 | End: 2025-03-07

## 2024-03-07 NOTE — PROGRESS NOTES
Pt has [facility] - if message below not viewed please call w information below:   L knee Xray show arthritis - no mention of any sig swelling or fluid.     Please message or call with any questions or concerns!  Thank you!  Deanna Sebastian MD, MPH  [facility]

## 2024-03-07 NOTE — TELEPHONE ENCOUNTER
----- Message from Deanna Sebastian MD sent at 3/7/2024 10:36 AM CST -----  Pt has [facility] - if message below not viewed please call w information below:   Lipid levels look much better! Recommend to continue current statin at same dose - will reorder.    Please message or call with any questions or concerns!  Thank you!  Deanna Sebastian MD, MPH  [facility]

## 2024-03-31 PROBLEM — E78.5 HYPERLIPIDEMIA: Status: ACTIVE | Noted: 2024-03-31

## 2024-03-31 PROBLEM — F33.41 MDD (MAJOR DEPRESSIVE DISORDER), RECURRENT, IN PARTIAL REMISSION: Chronic | Status: ACTIVE | Noted: 2024-03-06

## 2024-03-31 PROBLEM — I70.0 AORTIC ATHEROSCLEROSIS: Chronic | Status: ACTIVE | Noted: 2024-03-31

## 2024-03-31 PROBLEM — I70.0 AORTIC ATHEROSCLEROSIS: Status: ACTIVE | Noted: 2024-03-31

## 2024-03-31 PROBLEM — I70.0 AORTIC ATHEROSCLEROSIS: Chronic | Status: ACTIVE | Noted: 2024-03-06

## 2024-03-31 PROBLEM — F33.1 MODERATE EPISODE OF RECURRENT MAJOR DEPRESSIVE DISORDER: Chronic | Status: RESOLVED | Noted: 2022-10-24 | Resolved: 2024-03-31

## 2024-03-31 PROBLEM — M17.12 OSTEOARTHRITIS OF LEFT KNEE: Status: ACTIVE | Noted: 2024-03-31

## 2024-03-31 PROBLEM — E78.5 HYPERLIPIDEMIA: Chronic | Status: ACTIVE | Noted: 2024-03-31

## 2024-03-31 NOTE — ASSESSMENT & PLAN NOTE
Stable - encourage cont healthy nutritional intake - work on moving more, daily natural light exposure, sleep hygiene - f/u psychiatry as scheduled

## 2024-05-01 ENCOUNTER — OFFICE VISIT (OUTPATIENT)
Dept: PSYCHIATRY | Facility: CLINIC | Age: 69
End: 2024-05-01
Payer: MEDICARE

## 2024-05-01 VITALS — SYSTOLIC BLOOD PRESSURE: 143 MMHG | DIASTOLIC BLOOD PRESSURE: 81 MMHG | HEART RATE: 67 BPM

## 2024-05-01 DIAGNOSIS — F33.1 MODERATE EPISODE OF RECURRENT MAJOR DEPRESSIVE DISORDER: Primary | ICD-10-CM

## 2024-05-01 DIAGNOSIS — F41.1 GAD (GENERALIZED ANXIETY DISORDER): ICD-10-CM

## 2024-05-01 PROCEDURE — 99205 OFFICE O/P NEW HI 60 MIN: CPT | Mod: S$PBB,,, | Performed by: PSYCHIATRY & NEUROLOGY

## 2024-05-01 PROCEDURE — 99999 PR PBB SHADOW E&M-EST. PATIENT-LVL II: CPT | Mod: PBBFAC,,, | Performed by: PSYCHIATRY & NEUROLOGY

## 2024-05-01 PROCEDURE — 99212 OFFICE O/P EST SF 10 MIN: CPT | Mod: PBBFAC | Performed by: PSYCHIATRY & NEUROLOGY

## 2024-05-01 RX ORDER — ESCITALOPRAM OXALATE 10 MG/1
10 TABLET ORAL DAILY
Qty: 90 TABLET | Refills: 0 | Status: SHIPPED | OUTPATIENT
Start: 2024-05-01 | End: 2024-06-12 | Stop reason: SDUPTHER
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
## 2024-05-02 ENCOUNTER — OFFICE VISIT (OUTPATIENT)
Dept: SPORTS MEDICINE | Facility: CLINIC | Age: 69
End: 2024-05-02
Payer: MEDICARE

## 2024-05-02 DIAGNOSIS — G89.29 CHRONIC PAIN OF LEFT KNEE: ICD-10-CM

## 2024-05-02 DIAGNOSIS — M17.12 OSTEOARTHRITIS OF LEFT KNEE, UNSPECIFIED OSTEOARTHRITIS TYPE: ICD-10-CM

## 2024-05-02 DIAGNOSIS — M25.562 CHRONIC PAIN OF LEFT KNEE: ICD-10-CM

## 2024-05-02 PROCEDURE — 99999 PR PBB SHADOW E&M-EST. PATIENT-LVL III: CPT | Mod: PBBFAC,,, | Performed by: STUDENT IN AN ORGANIZED HEALTH CARE EDUCATION/TRAINING PROGRAM

## 2024-05-02 PROCEDURE — 99213 OFFICE O/P EST LOW 20 MIN: CPT | Mod: PBBFAC | Performed by: STUDENT IN AN ORGANIZED HEALTH CARE EDUCATION/TRAINING PROGRAM

## 2024-05-02 PROCEDURE — 99204 OFFICE O/P NEW MOD 45 MIN: CPT | Mod: S$PBB,,, | Performed by: STUDENT IN AN ORGANIZED HEALTH CARE EDUCATION/TRAINING PROGRAM

## 2024-05-02 NOTE — PATIENT INSTRUCTIONS
Assessment:  Kishore Sosa is a 68 y.o. male with a chief complaint of Pain of the Left Knee    Encounter Diagnoses   Name Primary?    Chronic pain of left knee     Osteoarthritis of left knee, unspecified osteoarthritis type       Plan:  XR reviewed - tricompartmental OA, most pronounced in lateral compartment  Labs reviewed - Cr 1.0, GFR > 60, LFTs WNL   History and clinical exam is consistent with chronic left knee pain due to tricompartmental osteoarthritis.  We have reviewed the natural history of this disorder and discussed the diagnosis, treatment options, and prognosis in detail.   Conservative management.    Okay to take over-the-counter Tylenol and Aleve, daily for pain relief.  Renal function reviewed today, without any sign of renal dysfunction   Recommend continued regular low-impact exercises, such as walking, biking, elliptical, water sports, Elkin Chi, etc..    Can consider for over-the-counter supplements, including:   Turmeric/curcumin   Ginger   Glucosamine chondroitin   Collagen  Lateral  brace ordered today  Lateral  brace required for varus instability due to knee valgus from lateral compartment osteoarthritis.   Contact us if you have not heard a status update in 2 weeks  If pain is not improving, or if worsening, then would recommend next step in treatment with corticosteroid injections.  Can also consider for additional injection options, such as viscosupplementation/hyaluronic acid injections, ortho biologics such as platelet rich plasma (PRP), and treatment of the genicular nerves such as with genicular nerve blocks or cryoneurolysis    Follow-up: As needed or sooner if there are any problems between now and then.    Thank you for choosing [facility] and Dr. Tres Martinez for your orthopedic & sports medicine care. It is our goal to provide you with exceptional care that will help keep you healthy, active, and get you back in the game.    Please  do not hesitate to reach out to us via email, phone, or MyChart with any questions, concerns, or feedback.    If you are experiencing pain/discomfort ,or have questions after 5pm and would like to be connected to the [facility] [place] on-call team, please call this number and specify which Sports Medicine provider is treating you: [phone]      Although there is not a cure for arthritis, there are effective ways to improve symptoms.     Exercise & Activity:  I recommend low impact activities such as elliptical and bicycle   Walking is great for arthritis: https://www.CityNews.com/3-reasons-walking-with-knee-arthritis/  If walking long distances, I recommend good quality well-cushioned shoes. Varsity sports can help you find the right ones: https://www.[employer].Gennio/  Aquatic and pool therapy is often helpful because it lessens the impact on the joint, strengthens the leg and thigh muscles, and helps to control swelling.   Elkin Chi and Yoga are great low impact exercises as well.  Knee motion is important to the health of the knee.     Knee Braces:  A compression knee sleeve can help limit swelling and provide proprioceptive feedback.     Prescriptions & Medications:  I do recommend formal physical therapy or at minimum a home exercise program.   Over the counter analgesic (pain-relieving) medications can help.   Examples are Tylenol, Ibuprofen, and Aleve.   Check with your primary care physician to make sure you don't have contra-indications to taking those medicines.  Some over the counter supplement solutions such as glucosamine and chondroitin may help with symptoms, although the evidence is mixed.  Other supplements you can try include:  Turmeric/curcumin  Ginger  Collagen  Vitamin C, D  Calcium  Omega-3 Fatty Acids/Fish Oils    Healthy Lifestyle:  Excess body weight can have a negative impact on joint health and on pain. I recommend healthy lifestyle choices  including nutrition and exercise that help reach and maintain an ideal body weight. Tips for Exercise: https://www.Synthace.SynapSense/13-exercise-tips-for-a-healthier-you/  Some diets cause increased inflammation. I recommend a balanced wholesome diet including some foods such as olives that are shown to decrease inflammation. More diet information available here: https://www.Synthace.SynapSense/8-best-foods-for-knee-arthritis/

## 2024-05-02 NOTE — PROGRESS NOTES
Patient ID: Kishore Sosa  YOB: 1955  MRN: [id number]    Chief Complaint: Pain of the Left Knee    Referred By: Dr. Sebastian    Occupation:       History of Present Illness: Kishore Sosa is a 68 y.o. male who presents today with Pain of the Left Knee    He complains of chronic left knee pain since late 2023 without injury.  He describes intermittent anterior knee aching pain that worsens with stairs and prolonged weight bearing activity.  He has tried using voltaren gel, and off the shelf knee brace and tylenol/ibuprofen OTC.  He has history of meniscus surgery from many years ago.  He used to run consistently for exercise.    Past Medical History:   Past Medical History:   Diagnosis Date    Anxiety     Asthma     childhood    Depression     Hyperlipidemia     Moderate episode of recurrent major depressive disorder 10/24/2022    Pulmonary embolism      Past Surgical History:   Procedure Laterality Date    CHOLECYSTECTOMY       Family History   Problem Relation Name Age of Onset    Hypertension Mother      Kidney disease Mother      Heart disease Father      Anxiety disorder Father      Diabetes Brother      Stroke Neg Hx      Cancer Neg Hx       Social History     Socioeconomic History    Marital status:     Number of children: 1   Tobacco Use    Smoking status: Former     Types: Pipe    Smokeless tobacco: Former    Tobacco comments:     quit ~ 2009   Substance and Sexual Activity    Alcohol use: Not Currently    Drug use: Never     Social Determinants of Health     Stress: No Stress Concern Present (10/21/2019)    [employer] - Occupational Stress Questionnaire     Feeling of Stress : Not at all     Medication List with Changes/Refills   Current Medications    ATORVASTATIN (LIPITOR) 20 MG TABLET    Take 1 tablet (20 mg total) by mouth once daily.    DICLOFENAC SODIUM (VOLTAREN) 1 % GEL    Apply 4 g topically 4 (four) times daily as  needed (L knee pain).    ESCITALOPRAM OXALATE (LEXAPRO) 10 MG TABLET    Take 1 tablet (10 mg total) by mouth once daily.    FINASTERIDE ORAL         Review of patient's allergies indicates:  No Known Allergies    Physical Exam:   There is no height or weight on file to calculate BMI.    Physical Exam  Detailed MSK exam:   Left Knee:  Inspection: Genu valgum  Palpation tenderness: lateral joint line, lateral patellar facet, lateral trochlea  Range of motion: 0 deg extension - 140 deg flexion  Strength:  5/5 Extension    5/5 Flexion  Stability:  Stable ACL/Lachman       Stable Posterior Drawer       Stable Valgus Stress    1+ with firm endpoint to Varus Stress  Patella Exam: Negative J-sign   Negative Patellar apprehension   Positive Patellar crepitus   N/V Exam:  Tibial:    Normal sensory (plantar foot)  Normal motor (FHL)    Sup Peroneal:   Normal sensory (dorsal foot)  Normal motor (Peroneals)            Deep Peroneal:   Normal sensory (1st web space)  Normal motor (EHL)    Sural:   Normal sensory (lateral foot)   Saphenous:   Normal sensory (medial lower leg)   Normal pedal pulses, warm and well perfused with capillary refill < 2 sec       Imaging:  X-ray Knee Ortho Left  Narrative: EXAM: XR KNEE ORTHO LEFT    CLINICAL HISTORY: Left knee pain.    TECHNIQUE: 3 view left knee series    FINDINGS: Moderate tricompartmental osteoarthritic change noted.  No acute fracture.  Impression:   Osteoarthritis left knee.    Finalized on: 3/6/2024 1:19 PM By:  Tunde Aragon MD  [facility]# [id number]      2024-03-06 13:21:[id number]    [facility]      Relevant imaging results were reviewed and interpreted by me and per my read:  Moderate to severe lateral compartment narrowing of the left knee.  Tricompartmental osteophytosis.  Genu valgum.  No fractures or other acute abnormalities.    This was discussed with the patient and / or family today.     Patient Instructions   Assessment:  Kishore Sosa is a 68 y.o. male with a chief complaint of  Pain of the Left Knee    Encounter Diagnoses   Name Primary?    Chronic pain of left knee     Osteoarthritis of left knee, unspecified osteoarthritis type       Plan:  XR reviewed - tricompartmental OA, most pronounced in lateral compartment  Labs reviewed - Cr 1.0, GFR > 60, LFTs WNL   History and clinical exam is consistent with chronic left knee pain due to tricompartmental osteoarthritis.  We have reviewed the natural history of this disorder and discussed the diagnosis, treatment options, and prognosis in detail.   Conservative management.    Okay to take over-the-counter Tylenol and Aleve, daily for pain relief.  Renal function reviewed today, without any sign of renal dysfunction   Recommend continued regular low-impact exercises, such as walking, biking, elliptical, water sports, Elkin Chi, etc..    Can consider for over-the-counter supplements, including:   Turmeric/curcumin   Ginger   Glucosamine chondroitin   Collagen  Lateral  brace ordered today  Lateral  brace required for varus instability due to knee valgus from lateral compartment osteoarthritis.   Contact us if you have not heard a status update in 2 weeks  If pain is not improving, or if worsening, then would recommend next step in treatment with corticosteroid injections.  Can also consider for additional injection options, such as viscosupplementation/hyaluronic acid injections, ortho biologics such as platelet rich plasma (PRP), and treatment of the genicular nerves such as with genicular nerve blocks or cryoneurolysis    Follow-up: As needed or sooner if there are any problems between now and then.    Thank you for choosing [facility] and Dr. Tres Martinez for your orthopedic & sports medicine care. It is our goal to provide you with exceptional care that will help keep you healthy, active, and get you back in the game.    Please do not hesitate to reach out to us via email, phone, or [employer] with any  questions, concerns, or feedback.    If you are experiencing pain/discomfort ,or have questions after 5pm and would like to be connected to the [facility] [place] on-call team, please call this number and specify which Sports Medicine provider is treating you: [phone]      A copy of today's visit note has been sent to the referring provider.           Tres Martinez MD  Primary Care Sports Medicine    Disclaimer: This note was prepared using a voice recognition system and is likely to have sound alike errors within the text.

## 2024-05-16 ENCOUNTER — PATIENT MESSAGE (OUTPATIENT)
Dept: PSYCHIATRY | Facility: CLINIC | Age: 69
End: 2024-05-16
Payer: MEDICARE

## 2024-05-16 NOTE — TELEPHONE ENCOUNTER
Dose of Lexapro 10 mg being increased to 2 tablets daily, per the plan formed at the patient's initial visit 2 weeks ago.
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
## 2024-06-12 ENCOUNTER — OFFICE VISIT (OUTPATIENT)
Dept: PSYCHIATRY | Facility: CLINIC | Age: 69
End: 2024-06-12
Payer: MEDICARE

## 2024-06-12 VITALS — HEART RATE: 65 BPM | DIASTOLIC BLOOD PRESSURE: 79 MMHG | SYSTOLIC BLOOD PRESSURE: 134 MMHG

## 2024-06-12 DIAGNOSIS — F33.1 MODERATE EPISODE OF RECURRENT MAJOR DEPRESSIVE DISORDER: Primary | ICD-10-CM

## 2024-06-12 DIAGNOSIS — F41.1 GAD (GENERALIZED ANXIETY DISORDER): ICD-10-CM

## 2024-06-12 PROCEDURE — 99999 PR PBB SHADOW E&M-EST. PATIENT-LVL II: CPT | Mod: PBBFAC,,, | Performed by: PSYCHIATRY & NEUROLOGY

## 2024-06-12 PROCEDURE — 99212 OFFICE O/P EST SF 10 MIN: CPT | Mod: PBBFAC | Performed by: PSYCHIATRY & NEUROLOGY

## 2024-06-12 PROCEDURE — 99214 OFFICE O/P EST MOD 30 MIN: CPT | Mod: S$PBB,,, | Performed by: PSYCHIATRY & NEUROLOGY

## 2024-06-12 RX ORDER — BUPROPION HYDROCHLORIDE 150 MG/1
150 TABLET ORAL DAILY
Qty: 90 TABLET | Refills: 0 | Status: SHIPPED | OUTPATIENT
Start: 2024-06-12

## 2024-06-12 RX ORDER — ESCITALOPRAM OXALATE 20 MG/1
20 TABLET ORAL DAILY
Qty: 90 TABLET | Refills: 0 | Status: SHIPPED | OUTPATIENT
Start: 2024-06-12

## 2024-06-17 ENCOUNTER — OFFICE VISIT (OUTPATIENT)
Dept: DERMATOLOGY | Facility: CLINIC | Age: 69
End: 2024-06-17
Payer: MEDICARE

## 2024-06-17 DIAGNOSIS — D48.9 NEOPLASM OF UNCERTAIN BEHAVIOR: Primary | ICD-10-CM

## 2024-06-17 DIAGNOSIS — Z85.828 HISTORY OF BASAL CELL CARCINOMA (BCC) EXCISION: ICD-10-CM

## 2024-06-17 DIAGNOSIS — Z98.890 HISTORY OF BASAL CELL CARCINOMA (BCC) EXCISION: ICD-10-CM

## 2024-06-17 DIAGNOSIS — L82.1 SEBORRHEIC KERATOSES: ICD-10-CM

## 2024-06-17 DIAGNOSIS — L57.0 ACTINIC KERATOSES: ICD-10-CM

## 2024-06-17 DIAGNOSIS — L90.5 SCAR CONDITION AND FIBROSIS OF SKIN: ICD-10-CM

## 2024-06-17 DIAGNOSIS — Z85.828 HISTORY OF SKIN CANCER: ICD-10-CM

## 2024-06-17 PROCEDURE — 17000 DESTRUCT PREMALG LESION: CPT | Mod: XS,PBBFAC | Performed by: PHYSICIAN ASSISTANT

## 2024-06-17 PROCEDURE — 99999 PR PBB SHADOW E&M-EST. PATIENT-LVL III: CPT | Mod: PBBFAC,,, | Performed by: PHYSICIAN ASSISTANT

## 2024-06-17 PROCEDURE — 11103 TANGNTL BX SKIN EA SEP/ADDL: CPT | Mod: PBBFAC | Performed by: PHYSICIAN ASSISTANT

## 2024-06-17 PROCEDURE — 88305 TISSUE EXAM BY PATHOLOGIST: CPT | Performed by: PATHOLOGY

## 2024-06-17 PROCEDURE — 99213 OFFICE O/P EST LOW 20 MIN: CPT | Mod: 25,S$PBB,, | Performed by: PHYSICIAN ASSISTANT

## 2024-06-17 PROCEDURE — 11102 TANGNTL BX SKIN SINGLE LES: CPT | Mod: PBBFAC | Performed by: PHYSICIAN ASSISTANT

## 2024-06-17 PROCEDURE — 11103 TANGNTL BX SKIN EA SEP/ADDL: CPT | Mod: S$PBB,,, | Performed by: PHYSICIAN ASSISTANT

## 2024-06-17 PROCEDURE — 99213 OFFICE O/P EST LOW 20 MIN: CPT | Mod: PBBFAC | Performed by: PHYSICIAN ASSISTANT

## 2024-06-17 PROCEDURE — 17003 DESTRUCT PREMALG LES 2-14: CPT | Mod: PBBFAC | Performed by: PHYSICIAN ASSISTANT

## 2024-06-17 PROCEDURE — 17003 DESTRUCT PREMALG LES 2-14: CPT | Mod: S$PBB,,, | Performed by: PHYSICIAN ASSISTANT

## 2024-06-17 PROCEDURE — 17000 DESTRUCT PREMALG LESION: CPT | Mod: S$PBB,XS,, | Performed by: PHYSICIAN ASSISTANT

## 2024-06-17 PROCEDURE — 88342 IMHCHEM/IMCYTCHM 1ST ANTB: CPT | Performed by: PATHOLOGY

## 2024-06-17 PROCEDURE — 11102 TANGNTL BX SKIN SINGLE LES: CPT | Mod: S$PBB,,, | Performed by: PHYSICIAN ASSISTANT

## 2024-06-17 NOTE — PROGRESS NOTES
Subjective:      Patient ID:  Kishore Sosa is a 68 y.o. male who presents for   Chief Complaint   Patient presents with    Skin Check     Hx of SCCis of left nasal sidewall (referred for Mohs, Dr. Prather, but pt notes he never followed through with scheduling and the area healed, no recurrence to date), BCC of forehead, left temple, right nasal sidewall (s/p Mohs, Dr. Che 2021), and BCC of right shoulder (s/p excision 2/11/2022, Dr Chahal), AK's (s/p cryo). Here for new c/o.    History of Present Illness: The patient presents with chief complaint of spot.  Location: left cheek  Duration: 2 weeks  Signs/Symptoms:  won't heal, tender, rough    Prior treatments: none    C/o rough spot of left dorsal hand (along 1st metacarpal)                Review of Systems    Objective:   Physical Exam   Skin:                    Diagram Legend     Erythematous scaling macule/papule c/w actinic keratosis       Vascular papule c/w angioma      Pigmented verrucoid papule/plaque c/w seborrheic keratosis      Yellow umbilicated papule c/w sebaceous hyperplasia      Irregularly shaped tan macule c/w lentigo     1-2 mm smooth white papules consistent with Milia      Movable subcutaneous cyst with punctum c/w epidermal inclusion cyst      Subcutaneous movable cyst c/w pilar cyst      Firm pink to brown papule c/w dermatofibroma      Pedunculated fleshy papule(s) c/w skin tag(s)      Evenly pigmented macule c/w junctional nevus     Mildly variegated pigmented, slightly irregular-bordered macule c/w mildly atypical nevus      Flesh colored to evenly pigmented papule c/w intradermal nevus       Pink pearly papule/plaque c/w basal cell carcinoma      Erythematous hyperkeratotic cursted plaque c/w SCC      Surgical scar with no sign of skin cancer recurrence      Open and closed comedones      Inflammatory papules and pustules      Verrucoid papule consistent consistent with wart     Erythematous eczematous patches and plaques      Dystrophic onycholytic nail with subungual debris c/w onychomycosis     Umbilicated papule    Erythematous-base heme-crusted tan verrucoid plaque consistent with inflamed seborrheic keratosis     Erythematous Silvery Scaling Plaque c/w Psoriasis     See annotation                        Assessment / Plan:      Pathology Orders:       Normal Orders This Visit    Specimen to Pathology, Dermatology     Comments:    Number of Specimens:->4  ------------------------->-------------------------  Spec 1 Procedure:->Biopsy  Spec 1 Clinical Impression:->pigmented lesion r/o atypia  Spec 1 Source:->left midline upper back  ------------------------->-------------------------  Spec 2 Procedure:->Biopsy  Spec 2 Clinical Impression:->r/o BCC vs ISK  Spec 2 Source:->right inferior shoulder  ------------------------->-------------------------  Spec 3 Procedure:->Biopsy  Spec 3 Clinical Impression:->r/o BCC  Spec 3 Source:->left lateral shoulder  ------------------------->-------------------------  Spec 4 Procedure:->Biopsy  Spec 4 Clinical Impression:->r/o NMSC vs. HAK  Spec 4 Source:->left dorsal hand (1st MCP)  Release to patient->Immediate    Questions:    Procedure Type: Dermatology and skin neoplasms    Number of Specimens: 4    ------------------------: -------------------------    Spec 1 Procedure: Biopsy    Spec 1 Clinical Impression: pigmented lesion r/o atypia    Spec 1 Source: left midline upper back    ------------------------: -------------------------    Spec 2 Procedure: Biopsy    Spec 2 Clinical Impression: r/o BCC vs ISK    Spec 2 Source: right inferior shoulder    ------------------------: -------------------------    Spec 3 Procedure: Biopsy    Spec 3 Clinical Impression: r/o BCC    Spec 3 Source: left lateral shoulder    ------------------------: -------------------------    Spec 4 Procedure: Biopsy    Spec 4 Clinical Impression: r/o NMSC vs. HAK    Spec 4 Source: left dorsal hand (1st MCP)    Clinical  Information: irregular dark brown patch; pearly papule; pink scaly irregular patch; hyperkeratotic scaly crusted plaque    Release to patient: Immediate          Neoplasm of uncertain behavior  -     Specimen to Pathology, Dermatology  PROCEDURE NOTE - SHAVE BIOPSY   Location: 4 sites. A) left midline upper back. B) right inferior shoulder. C) left lateral shoulder. D) left dorsal hand (1st MCP).     After risk, benefits, and alternatives were discussed with the patient, the patient agrees to the procedure by verbal informed consent.  The area(s) were cleansed with alcohol. 1.5 cc of lidocaine 1% with epinephrine was injected for local anesthesia into each lesion(s).  A sharp dermablade was used to remove part or all of the lesion(s).  The specimen(s) will be sent for tissue pathology.  Hemostasis was obtained with aluminum chloride and/or hyfrecation.  The area(s) were dressed with vaseline ointment and bandaged.  The patient tolerated the procedure well without adverse events.  Wound care instructions were given to the patient on the AVS.  The patient will be notified of pathology results once available. Results will also be available in Epic.    History of basal cell carcinoma (BCC) excision  -     Ambulatory referral/consult to Dermatology    Seborrheic keratoses  Reassurance given.  Lesions are benign.    History of skin cancer  Scar condition and fibrosis of skin  Well healed scars of right nasal side bridge, forehead, left temple. Recommend regular skin exams for surveillance.     Squamous Cell Carcinoma, In situ  By pathology 11/18/22, but pt lost to f/u for Mohs. Will resend referral to Dr. Solis. Encouraged pt to f/u with Mohs surgeon as per previous recs. He understands there could be deeper more invasive skin cancer that may recur.     Actinic Keratoses  Cryosurgery Procedure Note    The patient is informed of the precancerous quality and need for treatment of these lesions. After risks, benefits and  alternatives explained, including blistering, pain, hyper- and hypopigmentation, patient verbally consents to cryotherapy to precancerous lesions. Liquid nitrogen cryosurgery is applied to the 14 actinic keratoses, as detailed in the physical exam, to produce a freeze injury. The patient is aware that blisters may form and is instructed on wound care with gentle cleansing and use of vaseline ointment to keep moist until healed. The patient is supplied a handout on cryosurgery and is instructed to call if lesions do not completely resolve.         Follow up in about 3 months (around 9/17/2024) for call for results, Full Skin Exam.

## 2024-06-21 ENCOUNTER — TELEPHONE (OUTPATIENT)
Dept: PRIMARY CARE CLINIC | Facility: CLINIC | Age: 69
End: 2024-06-21
Payer: MEDICARE

## 2024-06-21 LAB
FINAL PATHOLOGIC DIAGNOSIS: NORMAL
GROSS: NORMAL
Lab: NORMAL
MICROSCOPIC EXAM: NORMAL

## 2024-07-02 ENCOUNTER — TELEPHONE (OUTPATIENT)
Dept: DERMATOLOGY | Facility: CLINIC | Age: 69
End: 2024-07-02
Payer: MEDICARE

## 2024-07-02 NOTE — TELEPHONE ENCOUNTER
Called pt regarding pathology results. No answer.[facility]     ----- Message from Leesa Ramirez PA-C sent at 7/1/2024 10:21 AM CDT -----  Please update path log and advise pt.  1) left midline upper back was SK. Please explain dx and reassure. No treatment necessary (benign).     2) right inferior shoulder was an inflamed SK. No further treatment necessary (benign).     3) left lateral shoulder- is a BCC. This is a skin cancer, but is an early and superficial one. We can treat this with an ED&C. Please explain procedure and schedule pt w/me in the next 6-8 weeks.     4) Left dorsal hand- is BCC (a little more aggressive type than the one on the arm, but still has a very good outcome typically with treatment). For this one on the hand, I recommend Mohs referral, Dr. Solis. Please fax last note, demos, path, and picture of left hand.     5) Please schedule for FSE in 3-4 months for surveillance.

## 2024-07-02 NOTE — TELEPHONE ENCOUNTER
Called pt regarding pathology results. Pt notified of all sites[place] [facility] [place] ED&C for 9/12 at 3pm with Ashley. Referral faxed. Pt verbalized understanding.     ----- Message from Leesa Ramirez PA-C sent at 7/1/2024 10:21 AM CDT -----  Please update path log and advise pt.  1) left midline upper back was SK. Please explain dx and reassure. No treatment necessary (benign).     2) right inferior shoulder was an inflamed SK. No further treatment necessary (benign).     3) left lateral shoulder- is a BCC. This is a skin cancer, but is an early and superficial one. We can treat this with an ED&C. Please explain procedure and schedule pt w/me in the next 6-8 weeks.     4) Left dorsal hand- is BCC (a little more aggressive type than the one on the arm, but still has a very good outcome typically with treatment). For this one on the hand, I recommend Mohs referral, Dr. Solis. Please fax last note, demos, path, and picture of left hand.     5) Please schedule for FSE in 3-4 months for surveillance.

## 2024-07-03 ENCOUNTER — PATIENT MESSAGE (OUTPATIENT)
Dept: PRIMARY CARE CLINIC | Facility: CLINIC | Age: 69
End: 2024-07-03

## 2024-07-03 ENCOUNTER — OFFICE VISIT (OUTPATIENT)
Dept: PRIMARY CARE CLINIC | Facility: CLINIC | Age: 69
End: 2024-07-03
Payer: MEDICARE

## 2024-07-03 DIAGNOSIS — M17.12 PRIMARY OSTEOARTHRITIS OF LEFT KNEE: ICD-10-CM

## 2024-07-03 DIAGNOSIS — C44.612 BASAL CELL CARCINOMA (BCC) OF SKIN OF RIGHT UPPER EXTREMITY INCLUDING SHOULDER: ICD-10-CM

## 2024-07-03 DIAGNOSIS — Z12.11 SCREEN FOR COLON CANCER: Primary | ICD-10-CM

## 2024-07-03 DIAGNOSIS — F33.41 MDD (MAJOR DEPRESSIVE DISORDER), RECURRENT, IN PARTIAL REMISSION: Chronic | ICD-10-CM

## 2024-07-03 DIAGNOSIS — C44.619 BASAL CELL CARCINOMA (BCC) OF SKIN OF LEFT UPPER EXTREMITY INCLUDING SHOULDER: ICD-10-CM

## 2024-07-03 DIAGNOSIS — E78.5 HYPERLIPIDEMIA, UNSPECIFIED HYPERLIPIDEMIA TYPE: Chronic | ICD-10-CM

## 2024-07-03 DIAGNOSIS — R03.0 BLOOD PRESSURE ELEVATED WITHOUT HISTORY OF HTN: ICD-10-CM

## 2024-07-03 PROCEDURE — 99214 OFFICE O/P EST MOD 30 MIN: CPT | Mod: 95,,, | Performed by: INTERNAL MEDICINE

## 2024-07-03 NOTE — PATIENT INSTRUCTIONS
If you are feeling unwell, we'd like to be the first ones to know here at [facility]! Please give us a call. Same day appointments are our top priority to keep you well and out of the emergency rooms and hospitals. Call [phone] for our direct line. After hours advice is always available. Please call [phone] after hours to speak to the on-call team.      Recommend Tetanus, Shingles, and Covid vaccines that can be scheduled at your pharmacy of choice. Shingles vaccine can also be scheduled here at [facility].    Please call to schedule nurse visit for Shingrix   Please bring home BP monitor to calibrate w clinic manual measurement   (can also check to see if it's on [facility] validated list)    Please let us know how home BP's are running (and pulse)

## 2024-07-03 NOTE — PROGRESS NOTES
"Kishore Sosa  07/12/2024  [id number]    Deanna Sebastian MD  Patient Care Team:  Deanna Sebastian MD as PCP - General (Internal Medicine)  Sari Garcia NP as Nurse Practitioner (Family Medicine)      The patient location is:  Patient Home   The chief complaint leading to consultation is: scheduled f/u    Visit Type: Virtual visit with synchronous audio and video  Each patient to whom he or she provides medical services by telemedicine is:  (1) informed of the relationship between the physician and patient and the respective role of any other health care provider with respect to management of the patient; and (2) notified that he or she may decline to receive medical services by telemedicine and may withdraw from such care at any time.    Chief Complaint:  Chief Complaint   Patient presents with    Follow-up     Pt is giving updates on seeing specialist.      History of Present Illness: Mr. Kishore Sosa is a 68 year old male here for scheduled f/u.      Medical issues include anxiety/depression, h/o asthma, h/o PE, h/o basal cell carcinoma, chronic L knee pain.    Has been seeing Dr. Baca who is prescribing lexapro, wellbutrin  Was seen by Derm JACQUELINE Ramirez 2 areas biopsied BCC - plan to "burn" L shoulder BCC and referred to external dermatologist Prader for Moh's to remove lesion on L hand   Met w Dr. [facility] for chronic L knee pain - prescribed knee brace - not running but has been walking hour to hour and half 5 days a week    No recent weight or VSS - thinks may have lost a little weight but doesn't have a working scale - has home BP wrist monitor but hasn't checked recently  Sleeping well     Never had colonoscopy - doesn't have anyone to accompany him at this time - agrees w plan for FIT instead for now     PHQ-4 Score: 2     From LOV w me 3/06/24  Watching what he eats - hasn't been running lately due to L knee pain - was walking 5-6 miles but hasn't past 2-3 months.  Reports vision " worsening. Wears glasses. Followed by external eye specialist - last encounter 1-2 years ago?  Sleep ok - nocturia 2x night on average.  PHQ-4 Score: 3     Upcoming appointments:  Future Appointments       Date Provider Specialty Appt Notes    7/12/2024  Primary Care [facility]    8/13/2024 Christie Dickerson NP Primary Care AWV    8/21/2024 José Baca MD Psychiatry f/u    8/22/2024 Leesa Ramirez PA-C Dermatology skin check    9/12/2024 Leesa Ramirez PA-C Dermatology ED&C- left lateral shoulder    10/1/2024 Deanna Sebastian MD Primary Care Three month f/u           The following were reviewed: Active problem list, medication list, allergies, family history, social history, and Health Maintenance.     History:  Past Medical History:   Diagnosis Date    Anxiety     Asthma     childhood    Depression     Hyperlipidemia     Moderate episode of recurrent major depressive disorder 10/24/2022    Pulmonary embolism      Patient Active Problem List   Diagnosis    Chronic pain of left knee    Mixed anxiety and depressive disorder    Overweight (BMI 25.0-29.9)    History of pulmonary embolism    Childhood asthma without complication    History of alcohol dependence    History of basal cell carcinoma (BCC) excision    Blood pressure elevated without history of HTN    Osteoarthritis of left knee    Hyperlipidemia    MDD (major depressive disorder), recurrent, in partial remission    Aortic atherosclerosis    Basal cell carcinoma (BCC) of skin of right upper extremity including shoulder    Basal cell carcinoma (BCC) of skin of left upper extremity including shoulder     Review of patient's allergies indicates:  No Known Allergies    Medications:  Current Outpatient Medications on File Prior to Visit   Medication Sig Dispense Refill    atorvastatin (LIPITOR) 20 MG tablet Take 1 tablet (20 mg total) by mouth once daily. 90 tablet 3    buPROPion (WELLBUTRIN XL) 150 MG TB24 tablet Take 1 tablet (150 mg total)  by mouth once daily. 90 tablet 0    EScitalopram oxalate (LEXAPRO) 20 MG tablet Take 1 tablet (20 mg total) by mouth once daily. 90 tablet 0     No current facility-administered medications on file prior to visit.       Medications have been reviewed and reconciled with patient at visit today.    Review of Systems   See HPI above    Exam:  BP Readings from Last 3 Encounters:   06/12/24 134/79   05/01/24 (!) 143/81   03/06/24 114/80      Wt Readings from Last 3 Encounters:   03/06/24 1118 80.8 kg (178 lb 2.1 oz)   12/07/23 0836 81.8 kg (180 lb 5.4 oz)   12/19/22 1102 84.7 kg (186 lb 12.8 oz)      Physical Exam  Vitals reviewed.   Constitutional:       General: He is not in acute distress.     Appearance: Normal appearance. He is not ill-appearing.   HENT:      Head: Normocephalic and atraumatic.      Right Ear: External ear normal.      Left Ear: External ear normal.      Nose: Nose normal.      Mouth/Throat:      Mouth: Mucous membranes are moist.      Pharynx: Oropharynx is clear.   Eyes:      Extraocular Movements: Extraocular movements intact.      Conjunctiva/sclera: Conjunctivae normal.      Comments: glasses   Cardiovascular:      Rate and Rhythm: Normal rate.   Pulmonary:      Effort: Pulmonary effort is normal. No respiratory distress.   Skin:     General: Skin is warm and dry.   Neurological:      Mental Status: He is alert. Mental status is at baseline.      Coordination: Coordination normal.   Psychiatric:         Mood and Affect: Mood normal.         Behavior: Behavior normal.         Thought Content: Thought content normal.         Judgment: Judgment normal.        Laboratory Reviewed  Lab Results   Component Value Date    WBC 7.49 12/07/2023    HGB 15.2 12/07/2023    HCT 42.8 12/07/2023     12/07/2023    MCV 81 (L) 12/07/2023    CHOL 152 03/06/2024    TRIG 95 03/06/2024    HDL 42 03/06/2024    LDLCALC 91.0 03/06/2024    ALT 42 12/07/2023    AST 36 12/07/2023     12/07/2023    K 4.5  "12/07/2023     12/07/2023    CREATININE 1.0 12/07/2023    BUN 19 12/07/2023    CO2 26 12/07/2023    MG 2.1 10/24/2022    TSH 1.714 12/07/2023    FREET4 0.96 12/07/2023    PSA 0.87 12/07/2023    HGBA1C 5.2 10/24/2022     Lab Results   Component Value Date    CALCIUM 9.6 12/07/2023    No results found for: "WHIFNYSO50"No results found for: "FOLATE"   Lab Results   Component Value Date    IRON 94 12/07/2023    TRANSFERRIN 309 12/07/2023    TIBC 457 (H) 12/07/2023    FESATURATED 21 12/07/2023      Lab Results   Component Value Date    EGFRNORACEVR >60.0 12/07/2023    ALBUMIN 4.2 12/07/2023   No results found for: "VTXYRBKJ96TY"       Assessment:   68 y.o. male with multiple co-morbid illnesses here for continued follow up of medical problems.      The primary encounter diagnosis was Screen for colon cancer. Diagnoses of MDD (major depressive disorder), recurrent, in partial remission, Blood pressure elevated without history of HTN, Primary osteoarthritis of left knee, Basal cell carcinoma (BCC) of skin of right upper extremity including shoulder, Basal cell carcinoma (BCC) of skin of left upper extremity including shoulder, and Hyperlipidemia, unspecified hyperlipidemia type were also pertinent to this visit.      Plan:   1. Screen for colon cancer  -     Fecal Immunochemical Test (iFOBT); Future; Expected date: 07/03/2024    2. MDD (major depressive disorder), recurrent, in partial remission  Assessment & Plan:  Stable w current Rx - working from home - looking forward to visiting his daughter - encourage stay active, socially engaged - cont f/u w Psychiatry       3. Blood pressure elevated without history of HTN  Assessment & Plan:  Recommend bring home wrist monitor to clinic to check if on validated list and to calibrate w clinic manual measurement - limit sodium, stay active!      4. Primary osteoarthritis of left knee  Assessment & Plan:  Encourage stay active and mobile to extent able - if resume running " advised to run on grass or rubber track - knee brace - may want to add exercises to improve upper leg strength also       5. Basal cell carcinoma (BCC) of skin of right upper extremity including shoulder  Assessment & Plan:  Scheduled for removal w [facility]      6. Basal cell carcinoma (BCC) of skin of left upper extremity including shoulder  Assessment & Plan:  Plan for  Moh's procedure w external dermatologist Prader to remove lesion on L hand       7. Hyperlipidemia, unspecified hyperlipidemia type  Assessment & Plan:  Tolerating statin w lipid levels essentially at goal - encourage stay mobile, healthy food and beverage choices       Other orders  -     Discontinue: varicella-zoster gE vac,2 of 2 SusR  -     Discontinue: adjuvant AS01B (PF)vial 1 of 2 Susp         Health Maintenance         Date Due Completion Date    TETANUS VACCINE Never done ---    Shingles Vaccine (1 of 2) Never done ---    Colorectal Cancer Screening Never done ---    RSV Vaccine (Age 60+ and Pregnant patients) (1 - 1-dose 60+ series) Never done ---    COVID-19 Vaccine (3 - Moderna risk series) 11/29/2021 11/1/2021    Influenza Vaccine (1) 09/01/2024 12/19/2022    Override on 8/24/2019: Done    PROSTATE-SPECIFIC ANTIGEN 12/07/2024 12/7/2023    High Dose Statin 07/03/2025 7/3/2024    Hemoglobin A1c (Diabetic Prevention Screening) 10/24/2025 10/24/2022    Lipid Panel 03/06/2029 3/6/2024          -Patient's lab results were reviewed and discussed with patient  -Treatment options and alternatives were discussed with the patient. Patient expressed understanding. Patient was given the opportunity to ask questions and be an active participant in their medical care. Patient had no further questions or concerns at this time.   -Patient is an overall moderate risk for health complications from their medical conditions.     Follow up: Follow up in about 3 months (around 10/3/2024) for Follow Up for EAWV with f/u w me 2-4 week after .  .  Patient  care plan are included in After visit summary.    TOTAL TIME evaluating and managing this patient for this encounter was 35 minutes. This time was spent personally by me on some of the following activities: review of patient's past medical history, assessing age-appropriate health maintenance needs, review of any interval history, review and interpretation of lab results, review and interpretation of imaging test results, review and interpretation of cardiology test results, reviewing consulting specialist notes, obtaining history from the patient and family, examination of the patient, medication reconciliation, managing and/or ordering prescription medications, ordering imaging tests, ordering referral to subspecialty provider(s), educating patient and answering their questions about diagnosis, treatment plan, and goals of treatment, discussing planned follow-up and final documentation of the visit. This time was exclusive of any separately billable procedures for this patient and exclusive of time spent treating any other patients.

## 2024-07-12 ENCOUNTER — CLINICAL SUPPORT (OUTPATIENT)
Dept: PRIMARY CARE CLINIC | Facility: CLINIC | Age: 69
End: 2024-07-12
Payer: MEDICARE

## 2024-07-12 DIAGNOSIS — Z23 IMMUNIZATION DUE: Primary | ICD-10-CM

## 2024-07-12 PROCEDURE — 90471 IMMUNIZATION ADMIN: CPT | Mod: PBBFAC,PN

## 2024-07-12 PROCEDURE — 99999PBSHW PR PBB SHADOW TECHNICAL ONLY FILED TO HB: Mod: PBBFAC,,,

## 2024-07-12 PROCEDURE — 90472 IMMUNIZATION ADMIN EACH ADD: CPT | Mod: PBBFAC,PN

## 2024-07-12 PROCEDURE — 90750 HZV VACC RECOMBINANT IM: CPT | Mod: PBBFAC,PN

## 2024-07-12 RX ADMIN — Medication 0.5 ML: at 09:07

## 2024-07-12 NOTE — ASSESSMENT & PLAN NOTE
Recommend bring home wrist monitor to clinic to check if on validated list and to calibrate w clinic manual measurement - limit sodium, stay active!

## 2024-07-12 NOTE — ASSESSMENT & PLAN NOTE
Tolerating statin w lipid levels essentially at goal - encourage stay mobile, healthy food and beverage choices

## 2024-07-12 NOTE — ASSESSMENT & PLAN NOTE
Celeste w current Rx - working from home - looking forward to visiting his daughter - encourage stay active, socially engaged - cont f/u w Psychiatry

## 2024-07-12 NOTE — ASSESSMENT & PLAN NOTE
Encourage stay active and mobile to extent able - if resume running advised to run on grass or rubber track - knee brace - may want to add exercises to improve upper leg strength also

## 2024-07-15 ENCOUNTER — DOCUMENTATION ONLY (OUTPATIENT)
Dept: PRIMARY CARE CLINIC | Facility: CLINIC | Age: 69
End: 2024-07-15
Payer: MEDICARE

## 2024-07-15 DIAGNOSIS — Z23 ENCOUNTER FOR HERPES ZOSTER VACCINATION: Primary | ICD-10-CM

## 2024-08-12 NOTE — PROGRESS NOTES
"  Kishore Sosa presented for a follow-up Medicare AWV today. The following components were reviewed and updated:    Medical history  Family History  Social history  Allergies and Current Medications  Health Risk Assessment  Health Maintenance  Care Team    **See Completed Assessments for Annual Wellness visit with in the encounter summary    The following assessments were completed:  Depression Screening  Cognitive function Screening  Timed Get Up Test  Whisper Test  Nutrition Screening    Vitals:    08/13/24 0903   BP: 118/64   BP Location: Right arm   Patient Position: Sitting   Pulse: 62   Temp: 97.3 °F (36.3 °C)   TempSrc: Skin   SpO2: 97%   Weight: 81.2 kg (179 lb 0.2 oz)   Height: 5' 9" (1.753 m)     Body mass index is 26.44 kg/m².            Review of Systems   Constitutional:  Negative for appetite change, chills, fatigue and fever.   HENT:  Negative for congestion, ear pain, postnasal drip, rhinorrhea and sore throat.    Eyes:  Negative for visual disturbance.   Respiratory:  Negative for cough and shortness of breath.    Cardiovascular:  Positive for palpitations. Negative for chest pain.   Gastrointestinal:  Negative for abdominal pain, constipation, diarrhea and vomiting.   Genitourinary:  Negative for dysuria, frequency and hematuria.   Musculoskeletal:  Positive for arthralgias and back pain. Negative for gait problem.   Skin:  Positive for rash.        Skin cancer   Neurological:  Negative for dizziness, weakness and light-headedness.   Psychiatric/Behavioral:  Negative for sleep disturbance. The patient is nervous/anxious.            Physical Exam  Vitals reviewed.   Constitutional:       General: He is not in acute distress.     Appearance: Normal appearance.   HENT:      Head: Normocephalic and atraumatic.      Nose: Nose normal.      Mouth/Throat:      Mouth: Mucous membranes are moist.   Eyes:      General: No scleral icterus.     Conjunctiva/sclera: Conjunctivae normal.   Cardiovascular: "      Rate and Rhythm: Normal rate and regular rhythm.      Heart sounds: No murmur heard.  Pulmonary:      Effort: Pulmonary effort is normal. No respiratory distress.      Breath sounds: Normal breath sounds.   Abdominal:      Palpations: Abdomen is soft. There is no mass.      Tenderness: There is no abdominal tenderness.   Musculoskeletal:      Cervical back: Normal range of motion and neck supple.      Right lower leg: No edema.      Left lower leg: No edema.   Lymphadenopathy:      Cervical: No cervical adenopathy.   Skin:     General: Skin is warm and dry.   Neurological:      Mental Status: He is alert and oriented to person, place, and time. Mental status is at baseline.   Psychiatric:         Mood and Affect: Mood normal.         Thought Content: Thought content normal.            Health Maintenance         Date Due Completion Date    TETANUS VACCINE Never done ---    Colorectal Cancer Screening Never done ---    RSV Vaccine (Age 60+ and Pregnant patients) (1 - 1-dose 60+ series) Never done ---    COVID-19 Vaccine (3 - Moderna risk series) 11/29/2021 11/1/2021    Influenza Vaccine (1) 09/01/2024 12/19/2022    Override on 8/24/2019: Done    Shingles Vaccine (2 of 2) 09/06/2024 7/12/2024    PROSTATE-SPECIFIC ANTIGEN 12/07/2024 12/7/2023    High Dose Statin 08/13/2025 8/13/2024    Hemoglobin A1c (Diabetic Prevention Screening) 10/24/2025 10/24/2022    Lipid Panel 03/06/2029 3/6/2024              PROBLEM LIST ITEMS ADDRESSED THIS VISIT:    1. Encounter for preventive health examination  Assessment & Plan:  2024 - discussed vaccines at pharmacy, 2nd Shingles vaccine in September    Review for Opioid Screening: Pt does not have Rx for Opioids      Review for Substance Use Disorders: Patient does not use illicit substances as reported        2. Mixed anxiety and depressive disorder  Assessment & Plan:  Chronic, stable  Continue POC  F/U with PCP or Psychiatry      3. Other hyperlipidemia  Assessment &  Plan:  Chronic, stable  Tolerates STATIN  Continue activity and wise food choices  F/U with PCP      4. Aortic atherosclerosis  Overview:  US AAA 1/12/23 Aortoiliac atherosclerosis: Minimal. No abdominal aortic aneurysm.    Assessment & Plan:     Lab Results   Component Value Date    CHOL 152 03/06/2024    HDL 42 03/06/2024    LDLCALC 91.0 03/06/2024    TRIG 95 03/06/2024     Chronic, stable  Continue STATIN  Daily physical activity, manage HTN  Mediterranean diet  F/U PCP       5. Basal cell carcinoma (BCC) of skin of left upper extremity including shoulder  Assessment & Plan:  Continue current POC  F/U with Dermatology            Provided Kishore UP with a 5-10 year written screening schedule and personal prevention plan. Recommendations were developed using the USPSTF age appropriate recommendations. Education, counseling, and referrals were provided as needed.  After Visit Summary printed and given to patient which includes a list of additional screenings\tests needed.    Future Appointments   Date Time Provider Department Center   8/22/2024  1:40 PM Leesa Ramirez PA-C [facility] [place]   9/12/2024  3:00 PM Leesa Ramirez PA-C [facility] [place]   10/1/2024  3:40 PM Deanna Sebastian MD [facility]          MARLENI Carter, NP-C  [facility] [address] [facility] [place] [address]    I offered to discuss advanced care planning, including how to pick a person who would make decisions for you if you were unable to make them for yourself, called a health care power of , and what kind of decisions you might make such as use of life sustaining treatments such as ventilators and tube feeding when faced with a life limiting illness recorded on a living will that they will need to know. (How you want to be cared for as you near the end of your natural life)     X Patient is interested in learning more about how to make advanced directives.  I provided them  paperwork and offered to discuss this with them.

## 2024-08-13 ENCOUNTER — OFFICE VISIT (OUTPATIENT)
Dept: PRIMARY CARE CLINIC | Facility: CLINIC | Age: 69
End: 2024-08-13
Payer: MEDICARE

## 2024-08-13 VITALS
OXYGEN SATURATION: 97 % | TEMPERATURE: 97 F | DIASTOLIC BLOOD PRESSURE: 64 MMHG | HEIGHT: 69 IN | SYSTOLIC BLOOD PRESSURE: 118 MMHG | HEART RATE: 62 BPM | WEIGHT: 179 LBS | BODY MASS INDEX: 26.51 KG/M2

## 2024-08-13 DIAGNOSIS — Z00.00 ENCOUNTER FOR PREVENTIVE HEALTH EXAMINATION: Primary | ICD-10-CM

## 2024-08-13 DIAGNOSIS — F41.8 MIXED ANXIETY AND DEPRESSIVE DISORDER: Chronic | ICD-10-CM

## 2024-08-13 DIAGNOSIS — E78.49 OTHER HYPERLIPIDEMIA: Chronic | ICD-10-CM

## 2024-08-13 DIAGNOSIS — I70.0 AORTIC ATHEROSCLEROSIS: Chronic | ICD-10-CM

## 2024-08-13 DIAGNOSIS — C44.619 BASAL CELL CARCINOMA (BCC) OF SKIN OF LEFT UPPER EXTREMITY INCLUDING SHOULDER: ICD-10-CM

## 2024-08-13 PROCEDURE — 99999 PR PBB SHADOW E&M-EST. PATIENT-LVL III: CPT | Mod: PBBFAC,,, | Performed by: NURSE PRACTITIONER

## 2024-08-13 PROCEDURE — 99213 OFFICE O/P EST LOW 20 MIN: CPT | Mod: PBBFAC,PN | Performed by: NURSE PRACTITIONER

## 2024-08-13 PROCEDURE — G0439 PPPS, SUBSEQ VISIT: HCPCS | Mod: ,,, | Performed by: NURSE PRACTITIONER

## 2024-08-13 NOTE — ASSESSMENT & PLAN NOTE
Lab Results   Component Value Date    CHOL 152 03/06/2024    HDL 42 03/06/2024    LDLCALC 91.0 03/06/2024    TRIG 95 03/06/2024     Chronic, stable  Continue STATIN  Daily physical activity, manage HTN  Mediterranean diet  F/U PCP

## 2024-08-13 NOTE — PATIENT INSTRUCTIONS
Counseling and Referral of Other Preventative  (Italic type indicates deductible and co-insurance are waived)    Patient Name: Kishore Sosa  Today's Date: 8/13/2024    Health Maintenance       Date Due Completion Date    TETANUS VACCINE Never done ---    Colorectal Cancer Screening Never done ---    RSV Vaccine (Age 60+ and Pregnant patients) (1 - 1-dose 60+ series) Never done ---    COVID-19 Vaccine (3 - Moderna risk series) 11/29/2021 11/1/2021    Influenza Vaccine (1) 09/01/2024 12/19/2022    Override on 8/24/2019: Done    Shingles Vaccine (2 of 2) 09/06/2024 7/12/2024    PROSTATE-SPECIFIC ANTIGEN 12/07/2024 12/7/2023    High Dose Statin 08/13/2025 8/13/2024    Hemoglobin A1c (Diabetic Prevention Screening) 10/24/2025 10/24/2022    Lipid Panel 03/06/2029 3/6/2024        No orders of the defined types were placed in this encounter.      The following information is provided to all patients.  This information is to help you find resources for any of the problems found today that may be affecting your health:                  Living healthy guide: www.[facility].[place].gov      Understanding Diabetes: www.diabetes.org      Eating healthy: www.cdc.gov/healthyweight      CDC home safety checklist: www.cdc.gov/steadi/patient.html      Agency on Aging: www.goea.[place].[facility]      Alcoholics anonymous (AA): www.aa.org      Physical Activity: www.harini.nih.gov/ba2bquz      Tobacco use: www.quitwithusla.org

## 2024-08-13 NOTE — ASSESSMENT & PLAN NOTE
2024 - discussed vaccines at pharmacy, 2nd Shingles vaccine in September    Review for Opioid Screening: Pt does not have Rx for Opioids      Review for Substance Use Disorders: Patient does not use illicit substances as reported

## 2024-08-20 NOTE — PROGRESS NOTES
Kishore Sosa   1955 08/21/2024        CURRENT PRESENTATION:   06/12/2024 documentation includes:   The patient presents for his 1st follow-up for diagnoses of generalized anxiety disorder and moderate episode of recurrent major depression.  The medication plan at the initial visit 6 weeks ago was:   The patient reports he prefers to try something on his formulary 1st.  Vortioxetine is on his formulary but has a high co-payment.  Pristiq is not on his formulary.  He agrees to return to Lexapro, with which he had no clear side effects, at 10 mg 1 tablet daily for 2 weeks, then message in me with regards to tolerability, at which point we will increase to 2 tablets daily if tolerated.  When he returns to clinic in 6 weeks, consideration be given to adding Wellbutrin.  Other considerations going forward include addition of BuSpar at doses higher than he used in the past.  ...In the current session, the patient reports decreased but not resolved depression and ZENA symptoms. He brings in his own PHQ-9 and results are 11 for depression and 10 for ZENA, and he notes that these are notably lower than prior to Lexapro. He feels improved and comments that his friend has given him positive feedback about what she observes.    ...Psychiatry Medication:  Continue Lexapro 20 mg daily.  Wellbutrin versus BuSpar was reviewed with the patient.  He elects Wellbutrin currently.     In the current session, PHQ scores are down to 6 from 11 for depression and stable/slightly up from 10 to 11 for anxiety.  The patient reports stably euthymia and some remaining depression.  He expresses the insight that the remaining depression is more psychological than biochemical.  No hillary, hypomania, psychosis, suicidal ideation, thoughts of self-harm, homicidal ideation, thoughts of harm towards others, or feelings of aggression.  No side effects of Lexapro or BuSpar apart from possibly some fatigue with Lexapro, for which he will move  the medication to early evening.    The patient describes years of avoidance of financial issues and symptoms of anxiety attacks if he get notices that something is coming in the mail (and he indicates that the notices are consistently about packages and not financial matters), being concerned that it is the IRS or some other financial matter (his history is 1 of financial consequences, and he indicates that he was 3 years behind on filing taxes; he indicates that the notices are consistently about packages and not financial matters that the moment of anxiety was unwarranted).  He expresses insight that the issue likely should be addressed with psychotherapy.  He describes years of avoidance of financial matters, with the avoidance being reinforcing as he has avoided the anxiety for long periods of time by avoiding addressing the matters, until he gets some kind of notice that something is coming in the mail.  With discussion, his plan is to make a to do list of financial matters and have his friend begin working through the matters with him (ending the avoidance with exposure, using cognitive and behavioral techniques and support to manage the anxiety of addressing the issues).    Interim history:  Living situation/supports:  His daughter has not pursued TMS yet and continues to state that she is depressed, although she is also engaging in activities that she enjoys.  He is concerned that she has some health problems.  He intends to make explicit to her that the decision about returning to [place] to be near her mother is hers and that she should consider the whole picture, her wishes, what the stepfather is saying, the availability of resources that would assist her mother, and the fact that she is not obligated to move and would be leaving behind significant positives in [facility] if she were to return to [place].  Last visit-  The patient says that his biggest area of worry is the financial situation  with his assisted.  He indicates that he will be getting social security at age 69 and will still be working to some degree; he reports that he also has a small pension and a couple of assisted plans, the balances of which he has not checked in several years.  He indicates that his plan is to have his friend look at the balances with him in the coming weeks.       The patient reports talking with his friend, a female LPC, about once weekly.  He texts with his daughter about every other week and is planning a brief trip to see her in [place] in August.  He notes that she deals with depression and TMS is being considered.  She has a very good job as an  of a group of scientific journals.  He says that her stepfather has been encouraging her to move back to [place] to help care for her mother, who has been diagnosed with mild cognitive impairment; he reports that he has given her the feedback that she should not give up her career and her life in [place].  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  Relationships:  The patient lives alone but has frequent text and telephone contact with his daughter who lives in [place].  His daughter is not  and has no children, and he suspects that she is clements.  He reports that he has a good female friend.  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  Medical issues:  Hyperlipidemia, aortic atherosclerosis, basal cell carcinoma left upper extremity  Nonpsychotropic Medications:  Atorvastatin   Allergies:   Review of patient's allergies indicates:  No Known Allergies  Alcohol use:  None  Other substance use:  None    Mental Status Exam:   Appearance:  Appropriately groomed  Orientation:   X4  Attitude:  Cooperative, engaged   Eye Contact:  Appropriate  Behavior:  Calm, appropriate  Speech:     Rate - WNL    Volume - WNL    Quantity - WNL    Tone - relaxed, appropriate  Pressure - no  Thought Processes:  Goal-directed  Mood:  Euthymic but still with some anxiety  Affect:  Without distress, euthymic, including ability to brighten at appropriate times  SI:  No, and no thoughts of self-harm  HI:  No, and no thoughts of harm towards others  Paranoia:  No  Delusions:  No  Hallucinations:  No  Attention:  Intact over the course of the session  Cognition:  No deficits noted over the course of the session  Insight:  Intact   Judgment:  Intact  Impulse Control:  Intact        ASSESSMENT:   Encounter Diagnoses   Name Primary?    ZENA (generalized anxiety disorder) Yes    Moderate episode of recurrent major depressive disorder          PLAN:     Follow up in 2 months.      Psychiatry Medication:  Continue Lexapro 20 mg, taking the medication in the early evening.  Continue Wellbutrin  mg daily.      Reviewed with patient:  Report side effects, other problems, or questions to the psychiatrist by way of the [facility] portal, [facility], or by calling [facility] at [phone].  Messages are checked during clinic hours only.  For urgent issues outside of clinic hours, call 911 or go to an emergency department.  Follow up with primary care/MD specialist for continued monitoring of general health and wellness and any medical conditions.  Call [facility] at [phone] or use the [facility] portal if necessary for scheduling or rescheduling.  It is the responsibility of the patient to reschedule an appointment if an appointment has been canceled or missed.  Understanding was expressed; and no further concerns or questions were raised at this time.     [id number]  2 or more stable chronic illnesses and Prescription drug management      Large portions of this note were completed by way of  voice recognition dictation software, and transcription errors are possible, such that specific information in the note should be considered in the context of the entire report.

## 2024-08-21 ENCOUNTER — OFFICE VISIT (OUTPATIENT)
Dept: PSYCHIATRY | Facility: CLINIC | Age: 69
End: 2024-08-21
Payer: MEDICARE

## 2024-08-21 VITALS — DIASTOLIC BLOOD PRESSURE: 80 MMHG | SYSTOLIC BLOOD PRESSURE: 136 MMHG | HEART RATE: 72 BPM

## 2024-08-21 DIAGNOSIS — F41.1 GAD (GENERALIZED ANXIETY DISORDER): Primary | ICD-10-CM

## 2024-08-21 DIAGNOSIS — F33.1 MODERATE EPISODE OF RECURRENT MAJOR DEPRESSIVE DISORDER: ICD-10-CM

## 2024-08-21 PROCEDURE — 99212 OFFICE O/P EST SF 10 MIN: CPT | Mod: PBBFAC | Performed by: PSYCHIATRY & NEUROLOGY

## 2024-08-21 PROCEDURE — 99214 OFFICE O/P EST MOD 30 MIN: CPT | Mod: S$PBB,,, | Performed by: PSYCHIATRY & NEUROLOGY

## 2024-08-21 PROCEDURE — 99999 PR PBB SHADOW E&M-EST. PATIENT-LVL II: CPT | Mod: PBBFAC,,, | Performed by: PSYCHIATRY & NEUROLOGY

## 2024-08-21 RX ORDER — ESCITALOPRAM OXALATE 20 MG/1
20 TABLET ORAL DAILY
Qty: 90 TABLET | Refills: 0 | Status: SHIPPED | OUTPATIENT
Start: 2024-08-21

## 2024-08-21 RX ORDER — BUPROPION HYDROCHLORIDE 150 MG/1
150 TABLET ORAL DAILY
Qty: 90 TABLET | Refills: 0 | Status: SHIPPED | OUTPATIENT
Start: 2024-08-21

## 2024-08-22 ENCOUNTER — OFFICE VISIT (OUTPATIENT)
Dept: DERMATOLOGY | Facility: CLINIC | Age: 69
End: 2024-08-22
Payer: MEDICARE

## 2024-08-22 DIAGNOSIS — L81.4 LENTIGINES: ICD-10-CM

## 2024-08-22 DIAGNOSIS — Z98.890 HISTORY OF BASAL CELL CARCINOMA (BCC) EXCISION: ICD-10-CM

## 2024-08-22 DIAGNOSIS — L90.5 SCAR CONDITION AND FIBROSIS OF SKIN: ICD-10-CM

## 2024-08-22 DIAGNOSIS — L82.1 SEBORRHEIC KERATOSES: Primary | ICD-10-CM

## 2024-08-22 DIAGNOSIS — Z85.828 HISTORY OF SKIN CANCER: ICD-10-CM

## 2024-08-22 DIAGNOSIS — L57.0 ACTINIC KERATOSES: ICD-10-CM

## 2024-08-22 DIAGNOSIS — Z85.828 HISTORY OF BASAL CELL CARCINOMA (BCC) EXCISION: ICD-10-CM

## 2024-08-22 PROCEDURE — 17000 DESTRUCT PREMALG LESION: CPT | Mod: PBBFAC | Performed by: PHYSICIAN ASSISTANT

## 2024-08-22 PROCEDURE — 99212 OFFICE O/P EST SF 10 MIN: CPT | Mod: PBBFAC | Performed by: PHYSICIAN ASSISTANT

## 2024-08-22 PROCEDURE — 17003 DESTRUCT PREMALG LES 2-14: CPT | Mod: PBBFAC | Performed by: PHYSICIAN ASSISTANT

## 2024-08-22 PROCEDURE — 17003 DESTRUCT PREMALG LES 2-14: CPT | Mod: S$PBB,,, | Performed by: PHYSICIAN ASSISTANT

## 2024-08-22 PROCEDURE — 99213 OFFICE O/P EST LOW 20 MIN: CPT | Mod: 25,S$PBB,, | Performed by: PHYSICIAN ASSISTANT

## 2024-08-22 PROCEDURE — 17000 DESTRUCT PREMALG LESION: CPT | Mod: S$PBB,,, | Performed by: PHYSICIAN ASSISTANT

## 2024-08-22 PROCEDURE — 99999 PR PBB SHADOW E&M-EST. PATIENT-LVL II: CPT | Mod: PBBFAC,,, | Performed by: PHYSICIAN ASSISTANT

## 2024-08-22 NOTE — PROGRESS NOTES
Subjective:      Patient ID:  Kishore Sosa is a 69 y.o. male who presents for   Chief Complaint   Patient presents with    Skin Check     [facility]. Pt concern on the of the skin lesion located on the left shoulder, left hand and right arm. X several years s/s none Tx cryo . Pt states a history of BCC.      Hx of BCC if left dorsal hand (bx proven, awaiting Mohs w/Dr. Solis next week), BCC superficial of left shoulder (scheduled for ED&C on 9/12/24), ISK's, Sk's, AK of left cheek. Also, Hx of SCCis of left nasal sidewall (referred for Mohs, Dr. Solis, but pt notes he never followed through with scheduling and the area healed, no recurrence to date, resent referral to [facility] after last visit), BCC of forehead, left temple, right nasal sidewall (s/p Mohs, Dr. Che 2021), and BCC of right shoulder (s/p excision 2/11/2022, Dr Chahal), AK's (s/p cryo). Here for skin check today. Still w/some residual roughness of left cheek.        3. Skin, left lateral shoulder, shave biopsy:  -BASAL CELL CARCINOMA, SUPERFICIAL TYPE, EXCISED IN THE PLANES OF THE SECTIONS EXAMINED, see comment      Comment:  The tumor appears excised in the planes of section examined.  However, given the skipped nature of superficial basal cell carcinomas, the lesion may extend outside the peripheral margins examined and therefore clinical correlation is  recommended.  This lesion is skin cancer. You will be contacted regarding treatment.    4. Skin, left dorsal hand, shave biopsy:  -BASAL CELL CARCINOMA, INFILTRATIVE AND SUPERFICIAL TYPE, EXTENDING TO THE PERIPHERAL BIOPSY EDGE    This lesion is skin cancer. You will be contacted regarding treatment.  Comment: Interp By Jose Godfrey M.D., Signed on 06/21/2024 at 14:47              Review of Systems    Objective:   Physical Exam   Constitutional: He appears well-developed and well-nourished. No distress.   Neurological: He is alert and oriented to person, place, and time. He is not disoriented.    Psychiatric: He has a normal mood and affect.   Skin:   Areas Examined (abnormalities noted in diagram):   Scalp / Hair Palpated and Inspected  Head / Face Inspection Performed  Neck Inspection Performed  Chest / Axilla Inspection Performed  Abdomen Inspection Performed  Genitals / Buttocks / Groin Inspection Performed  Back Inspection Performed  RUE Inspected  LUE Inspection Performed  RLE Inspected  LLE Inspection Performed  Nails and Digits Inspection Performed                 Diagram Legend     Erythematous scaling macule/papule c/w actinic keratosis       Vascular papule c/w angioma      Pigmented verrucoid papule/plaque c/w seborrheic keratosis      Yellow umbilicated papule c/w sebaceous hyperplasia      Irregularly shaped tan macule c/w lentigo     1-2 mm smooth white papules consistent with Milia      Movable subcutaneous cyst with punctum c/w epidermal inclusion cyst      Subcutaneous movable cyst c/w pilar cyst      Firm pink to brown papule c/w dermatofibroma      Pedunculated fleshy papule(s) c/w skin tag(s)      Evenly pigmented macule c/w junctional nevus     Mildly variegated pigmented, slightly irregular-bordered macule c/w mildly atypical nevus      Flesh colored to evenly pigmented papule c/w intradermal nevus       Pink pearly papule/plaque c/w basal cell carcinoma      Erythematous hyperkeratotic cursted plaque c/w SCC      Surgical scar with no sign of skin cancer recurrence      Open and closed comedones      Inflammatory papules and pustules      Verrucoid papule consistent consistent with wart     Erythematous eczematous patches and plaques     Dystrophic onycholytic nail with subungual debris c/w onychomycosis     Umbilicated papule    Erythematous-base heme-crusted tan verrucoid plaque consistent with inflamed seborrheic keratosis     Erythematous Silvery Scaling Plaque c/w Psoriasis     See annotation      Assessment / Plan:        Seborrheic keratoses  Reassurance given.  Lesions are  benign.    History of basal cell carcinoma (BCC) excision  History of skin cancer  Scar condition and fibrosis of skin  Continue regular skin exams for surveillance. All scars examined today are well healed with NER.    Lentigines  Encouraged photoprotection.    Actinic keratoses  Cryosurgery Procedure Note    The patient is informed of the precancerous quality and need for treatment of these lesions. After risks, benefits and alternatives explained, including blistering, pain, hyper- and hypopigmentation, patient verbally consents to cryotherapy to precancerous lesions. Liquid nitrogen cryosurgery is applied to the 7 actinic keratoses, as detailed in the physical exam, to produce a freeze injury. The patient is aware that blisters may form and is instructed on wound care with gentle cleansing and use of vaseline ointment to keep moist until healed. The patient is supplied a handout on cryosurgery and is instructed to call if lesions do not completely resolve.             No follow-ups on file.

## 2024-09-05 ENCOUNTER — PATIENT MESSAGE (OUTPATIENT)
Dept: DERMATOLOGY | Facility: CLINIC | Age: 69
End: 2024-09-05
Payer: MEDICARE

## 2024-10-01 ENCOUNTER — OFFICE VISIT (OUTPATIENT)
Dept: PRIMARY CARE CLINIC | Facility: CLINIC | Age: 69
End: 2024-10-01
Payer: MEDICARE

## 2024-10-01 VITALS
TEMPERATURE: 97 F | DIASTOLIC BLOOD PRESSURE: 72 MMHG | OXYGEN SATURATION: 96 % | HEIGHT: 69 IN | WEIGHT: 185.19 LBS | BODY MASS INDEX: 27.43 KG/M2 | SYSTOLIC BLOOD PRESSURE: 126 MMHG | HEART RATE: 50 BPM

## 2024-10-01 DIAGNOSIS — R73.9 HYPERGLYCEMIA: ICD-10-CM

## 2024-10-01 DIAGNOSIS — E61.1 IRON DEFICIENCY: ICD-10-CM

## 2024-10-01 DIAGNOSIS — E78.5 HYPERLIPIDEMIA, UNSPECIFIED HYPERLIPIDEMIA TYPE: Primary | ICD-10-CM

## 2024-10-01 DIAGNOSIS — F41.8 MIXED ANXIETY AND DEPRESSIVE DISORDER: Chronic | ICD-10-CM

## 2024-10-01 DIAGNOSIS — Z12.5 PROSTATE CANCER SCREENING: ICD-10-CM

## 2024-10-01 DIAGNOSIS — Z13.29 SCREENING FOR HYPOTHYROIDISM: ICD-10-CM

## 2024-10-01 DIAGNOSIS — Z13.1 SCREENING FOR DIABETES MELLITUS: ICD-10-CM

## 2024-10-01 DIAGNOSIS — R53.83 FATIGUE, UNSPECIFIED TYPE: ICD-10-CM

## 2024-10-01 DIAGNOSIS — E66.3 OVERWEIGHT (BMI 25.0-29.9): ICD-10-CM

## 2024-10-01 DIAGNOSIS — M17.12 OSTEOARTHRITIS OF LEFT KNEE, UNSPECIFIED OSTEOARTHRITIS TYPE: Chronic | ICD-10-CM

## 2024-10-01 DIAGNOSIS — R63.5 WEIGHT GAIN: ICD-10-CM

## 2024-10-01 PROCEDURE — 99999 PR PBB SHADOW E&M-EST. PATIENT-LVL III: CPT | Mod: PBBFAC,,, | Performed by: INTERNAL MEDICINE

## 2024-10-01 PROCEDURE — 99213 OFFICE O/P EST LOW 20 MIN: CPT | Mod: PBBFAC,PN | Performed by: INTERNAL MEDICINE

## 2024-10-01 PROCEDURE — 99214 OFFICE O/P EST MOD 30 MIN: CPT | Mod: S$PBB,,, | Performed by: INTERNAL MEDICINE

## 2024-10-01 NOTE — PATIENT INSTRUCTIONS
If you are feeling unwell, we'd like to be the first ones to know here at [facility]! Please give us a call. Same day appointments are our top priority to keep you well and out of the emergency rooms and hospitals. Call [phone] for our direct line. After hours advice is always available. Please call [phone] after hours to speak to the on-call team.      Recommend Tetanus and Shingles (2nd dose) and RSV vaccines that can be scheduled at your pharmacy of choice.    Please complete FIT test sometime soon!

## 2024-10-01 NOTE — PROGRESS NOTES
Kishore Sosa  10/01/2024  [id number]    Deanna Sebastian MD  Patient Care Team:  Deanna Sebastian MD as PCP - General (Internal Medicine)    Visit Type: Follow-up    Mr. Kishore Sosa is a 68 year old male here for scheduled f/u.      Medical issues include anxiety/depression, h/o asthma, h/o PE, h/o basal cell carcinoma, chronic L knee pain.    History of Present Illness    CHIEF COMPLAINT:  Mr. Sosa presents today for follow up.    WEIGHT MANAGEMENT AND EXERCISE:  He reports difficulty losing weight despite efforts to increase physical activity. He has gained weight since stopping jogging due to knee issues. He is attempting to increase walking frequency and intensity. We discussed running on grass surfaces or a rubberized track to mitigate knee impact. Regarding diet, he is trying to cut back on sweets, increase lean protein in his diet. He is contemplating keeping a food diary for more accurate calorie tracking.    KNEE ARTHRITIS:  He reports moderate to severe arthritis in the knee, diagnosed by a sports doctor. He uses a knee brace while walking. He denies pain during activity, including jogging, but experiences pain afterwards.    CARDIOVASCULAR:  He reports his heart rate usually runs in the 60s, noting that his current heart rate of 50 bpm is unusually low. He denies any symptoms associated with the low heart rate. He has a history of premature ventricular contractions (PVCs), experiencing them occasionally but denies that they are bothersome.    MEDICATIONS:  He changed the timing of Lexapro administration from morning to evening as suggested by Dr. Lopez, noting less daytime sleepiness. He experiences vivid, colorful, and realistic dreams as a side effect, sometimes causing confusion upon waking but he does not find these dreams distressing.    DERMATOLOGY:  He recently underwent Mohs surgery for two basal cell carcinomas and one squamous cell carcinoma on his nose and left upper shoulder.  "The nose surgical site is healing gradually. He has noticed a new suspicious spot and has scheduled a follow-up visit with Dr. Ely in a few months.    FOOT ISSUES:  He reports a history of recurrent toe fungus that initially improved with topical treatment. The condition does not cause pain. He expresses interest in treatment options but is concerned about potential liver impacts of oral antifungal medications.    VACCINATIONS:  He has received both flu and COVID vaccines for the current year. He is due for tetanus, RSV, and the second dose of the shingles vaccine.     ROS:  General: -fever, -chills, -fatigue, -weight gain, -weight loss  Eyes: -vision changes, -redness, -discharge  ENT: -ear pain, -nasal congestion, -sore throat  Cardiovascular: -chest pain, -palpitations, -lower extremity edema  Respiratory: -cough, -shortness of breath  Gastrointestinal: -abdominal pain, -nausea, -vomiting, -diarrhea, -constipation, -blood in stool  Genitourinary: -dysuria, -hematuria, -frequency  Musculoskeletal: +joint pain, -muscle pain  Skin: -rash, +lesion  Neurological: -headache, -dizziness, -numbness, -tingling  Psychiatric: -anxiety, -depression, -sleep difficulty        PHQ-4 Score: 2     From last (virtual) visit w me 7/03/24  Has been seeing Psychiatrist Dr. Baca who is prescribing lexapro, wellbutrin  Was seen by Derm JACQUELINE Ramirez 2 areas biopsied BCC - plan to "burn" L shoulder BCC and referred to external dermatologist Prader for Moh's to remove lesion on L hand   Met w Dr. [facility] for chronic L knee pain - prescribed knee brace - not running but has been walking hour to hour and half 5 days a week     No recent weight or VSS - thinks may have lost a little weight but doesn't have a working scale - has home BP wrist monitor but hasn't checked recently  Sleeping well      Never had colonoscopy - doesn't have anyone to accompany him at this time - agrees w plan for FIT instead for now      PHQ-4 Score: 2 "     Recent appointments:   8/21/24 Psych Phuong ZENA/MDD  8/13/24 O[facility] Tuan EAWV    Completed Mohs x 3  last month w Dr. Solis for SCC on nose and BCC on L hand and L upper arm     Upcoming appointments:  Future Appointments       Date Provider Specialty Appt Notes    12/9/2024  Primary Care Fasting lab    12/16/2024 José Baca MD Psychiatry f/u    12/27/2024 Deanna Sebastian MD Primary Care Three month f/u    1/23/2025 Leesa Ramirez, PAKyleC Dermatology 4 month f/u           The following were reviewed: Active problem list, medication list, allergies, family history, social history, and Health Maintenance.     Medications have been reviewed and reconciled with patient at visit today.    Exam:   Vitals:    10/01/24 1558   BP: 126/72   Pulse: (!) 50   Temp: 97.4 °F (36.3 °C)     Weight: 84 kg (185 lb 3 oz)   Body mass index is 27.35 kg/m².    BP Readings from Last 3 Encounters:   10/01/24 126/72   08/21/24 136/80   08/13/24 118/64      Wt Readings from Last 3 Encounters:   10/01/24 1558 84 kg (185 lb 3 oz)   08/13/24 0903 81.2 kg (179 lb 0.2 oz)   03/06/24 1118 80.8 kg (178 lb 2.1 oz)      Physical Exam  Vitals reviewed.   Constitutional:       General: He is not in acute distress.     Appearance: Normal appearance. He is not ill-appearing.   HENT:      Head: Normocephalic and atraumatic.      Right Ear: External ear normal.      Left Ear: External ear normal.      Nose: Nose normal.      Mouth/Throat:      Mouth: Mucous membranes are moist.      Pharynx: Oropharynx is clear.   Eyes:      Extraocular Movements: Extraocular movements intact.      Conjunctiva/sclera: Conjunctivae normal.      Comments: glasses   Cardiovascular:      Rate and Rhythm: Regular rhythm. Bradycardia present.      Heart sounds: No murmur heard.  Pulmonary:      Effort: Pulmonary effort is normal. No respiratory distress.      Breath sounds: No wheezing, rhonchi or rales.   Abdominal:      General: Bowel sounds are normal.       "Palpations: Abdomen is soft.      Tenderness: There is no abdominal tenderness. There is no guarding.   Musculoskeletal:      Right lower leg: No edema.      Left lower leg: No edema.   Skin:     General: Skin is warm and dry.      Findings: Lesion present.   Neurological:      General: No focal deficit present.      Mental Status: He is alert and oriented to person, place, and time. Mental status is at baseline.      Coordination: Coordination normal.      Gait: Gait normal.   Psychiatric:         Mood and Affect: Mood normal.         Behavior: Behavior normal.         Thought Content: Thought content normal.         Judgment: Judgment normal.        Laboratory Reviewed  Lab Results   Component Value Date    WBC 7.49 12/07/2023    HGB 15.2 12/07/2023    HCT 42.8 12/07/2023     12/07/2023    MCV 81 (L) 12/07/2023    CHOL 152 03/06/2024    TRIG 95 03/06/2024    HDL 42 03/06/2024    LDLCALC 91.0 03/06/2024    ALT 42 12/07/2023    AST 36 12/07/2023     12/07/2023    K 4.5 12/07/2023     12/07/2023    CREATININE 1.0 12/07/2023    BUN 19 12/07/2023    CO2 26 12/07/2023    MG 2.1 10/24/2022    TSH 1.714 12/07/2023    FREET4 0.96 12/07/2023    PSA 0.87 12/07/2023    HGBA1C 5.2 10/24/2022     Lab Results   Component Value Date    CALCIUM 9.6 12/07/2023    No results found for: "STFWNWEP26"No results found for: "FOLATE"   Lab Results   Component Value Date    IRON 94 12/07/2023    TRANSFERRIN 309 12/07/2023    TIBC 457 (H) 12/07/2023    FESATURATED 21 12/07/2023      Lab Results   Component Value Date    EGFRNORACEVR >60.0 12/07/2023    ALBUMIN 4.2 12/07/2023   No results found for: "XUHNMNDD94IH"       Assessment:   69 y.o. male with multiple co-morbid illnesses here for continued follow up of medical problems.      The primary encounter diagnosis was Hyperlipidemia, unspecified hyperlipidemia type. Diagnoses of Iron deficiency, Mixed anxiety and depressive disorder, Fatigue, unspecified type, Weight " gain, Screening for hypothyroidism, Screening for diabetes mellitus, Hyperglycemia, Prostate cancer screening, Overweight (BMI 25.0-29.9), and Osteoarthritis of left knee, unspecified osteoarthritis type were also pertinent to this visit.      Plan:   1. Hyperlipidemia, unspecified hyperlipidemia type  -     Hemoglobin A1C; Future; Expected date: 12/09/2024  -     Lipid Panel; Future; Expected date: 12/09/2024  -     Comprehensive Metabolic Panel; Future; Expected date: 12/09/2024    2. Iron deficiency  -     Hemoglobin A1C; Future; Expected date: 12/09/2024  -     CBC Auto Differential; Future; Expected date: 12/09/2024  -     IRON AND TIBC; Future; Expected date: 12/09/2024  -     FERRITIN; Future; Expected date: 12/09/2024    3. Mixed anxiety and depressive disorder  -     Hemoglobin A1C; Future; Expected date: 12/09/2024  -     TSH; Future; Expected date: 12/09/2024    4. Fatigue, unspecified type  -     Hemoglobin A1C; Future; Expected date: 12/09/2024  -     TSH; Future; Expected date: 12/09/2024  -     Comprehensive Metabolic Panel; Future; Expected date: 12/09/2024    5. Weight gain  -     Hemoglobin A1C; Future; Expected date: 12/09/2024  -     TSH; Future; Expected date: 12/09/2024  -     Comprehensive Metabolic Panel; Future; Expected date: 12/09/2024    6. Screening for hypothyroidism    7. Screening for diabetes mellitus    8. Hyperglycemia  -     Hemoglobin A1C; Future; Expected date: 12/09/2024    9. Prostate cancer screening  -     PSA, SCREENING; Future; Expected date: 12/09/2024    10. Overweight (BMI 25.0-29.9)  Assessment & Plan:  He has gained weight since stopping jogging due to knee issues. He is attempting to increase walking frequency and intensity. We discussed running on grass surfaces or a rubberized track to mitigate knee impact. Regarding diet, he is trying to cut back on sweets, increase lean protein in his diet. He is contemplating keeping a food diary for more accurate calorie  tracking.      11. Osteoarthritis of left knee, unspecified osteoarthritis type  Assessment & Plan:  He has gained weight since stopping jogging due to knee issues. He is attempting to increase walking frequency and intensity. We discussed running on grass surfaces or a rubberized track to mitigate knee impact. Ice after exertion if needed. Compression.              Health Maintenance         Date Due Completion Date    TETANUS VACCINE Never done ---    Colorectal Cancer Screening Never done ---    RSV Vaccine (Age 60+ and Pregnant patients) (1 - Risk 60-74 years 1-dose series) Never done ---    Shingles Vaccine (2 of 2) 09/06/2024 7/12/2024    COVID-19 Vaccine (4 - 2024-25 season) 11/19/2024 9/24/2024    PROSTATE-SPECIFIC ANTIGEN 12/07/2024 12/7/2023    High Dose Statin 10/01/2025 10/1/2024    Hemoglobin A1c (Diabetic Prevention Screening) 10/24/2025 10/24/2022    Lipid Panel 03/06/2029 3/6/2024          -Patient's lab results were reviewed and discussed with patient  -Treatment options and alternatives were discussed with the patient. Patient expressed understanding. Patient was given the opportunity to ask questions and be an active participant in their medical care. Patient had no further questions or concerns at this time.     Follow up: Follow up in about 3 months (around 1/1/2025) for Follow Up.    After visit summary printed and given to patient upon discharge.  Patient care plan included in After visit summary.    TOTAL TIME evaluating and managing this patient for this encounter was 37 minutes. This time was spent personally by me on some of the following activities: review of patient's past medical history, assessing age-appropriate health maintenance needs, review of any interval history, review and interpretation of lab results, review and interpretation of imaging test results, review and interpretation of cardiology test results, reviewing consulting specialist notes, obtaining history from the  patient and family, examination of the patient, medication reconciliation, managing and/or ordering prescription medications, ordering imaging tests, ordering referral to subspecialty provider(s), educating patient and answering their questions about diagnosis, treatment plan, and goals of treatment, discussing planned follow-up and final documentation of the visit. This time was exclusive of any separately billable procedures for this patient and exclusive of time spent treating any other patients.     This note was generated with the assistance of ambient listening technology. Verbal consent was obtained by the patient and accompanying visitor(s) for the recording of patient appointment to facilitate this note. I attest to having reviewed and edited the generated note for accuracy, though some syntax or spelling errors may persist. Please contact the author of this note for any clarification.

## 2024-10-20 PROBLEM — C44.619 BASAL CELL CARCINOMA (BCC) OF SKIN OF LEFT UPPER EXTREMITY INCLUDING SHOULDER: Status: RESOLVED | Noted: 2024-07-03 | Resolved: 2024-10-20

## 2024-10-20 PROBLEM — Z00.00 ENCOUNTER FOR PREVENTIVE HEALTH EXAMINATION: Status: RESOLVED | Noted: 2024-08-13 | Resolved: 2024-10-20

## 2024-10-20 PROBLEM — Z23 ENCOUNTER FOR HERPES ZOSTER VACCINATION: Status: RESOLVED | Noted: 2024-07-15 | Resolved: 2024-10-20

## 2024-10-20 PROBLEM — C44.612 BASAL CELL CARCINOMA (BCC) OF SKIN OF RIGHT UPPER EXTREMITY INCLUDING SHOULDER: Status: RESOLVED | Noted: 2024-07-03 | Resolved: 2024-10-20

## 2024-10-20 PROBLEM — E66.3 OVERWEIGHT (BMI 25.0-29.9): Chronic | Status: ACTIVE | Noted: 2019-10-21

## 2024-10-20 NOTE — ASSESSMENT & PLAN NOTE
He has gained weight since stopping jogging due to knee issues. He is attempting to increase walking frequency and intensity. We discussed running on grass surfaces or a rubberized track to mitigate knee impact. Ice after exertion if needed. Compression.

## 2024-10-20 NOTE — ASSESSMENT & PLAN NOTE
He has gained weight since stopping jogging due to knee issues. He is attempting to increase walking frequency and intensity. We discussed running on grass surfaces or a rubberized track to mitigate knee impact. Regarding diet, he is trying to cut back on sweets, increase lean protein in his diet. He is contemplating keeping a food diary for more accurate calorie tracking.

## 2024-10-24 ENCOUNTER — PATIENT MESSAGE (OUTPATIENT)
Dept: RESEARCH | Facility: HOSPITAL | Age: 69
End: 2024-10-24
Payer: MEDICARE

## 2024-12-03 DIAGNOSIS — F41.1 GAD (GENERALIZED ANXIETY DISORDER): ICD-10-CM

## 2024-12-03 DIAGNOSIS — F33.1 MODERATE EPISODE OF RECURRENT MAJOR DEPRESSIVE DISORDER: ICD-10-CM

## 2024-12-03 RX ORDER — ESCITALOPRAM OXALATE 20 MG/1
20 TABLET ORAL
Qty: 90 TABLET | Refills: 0 | Status: SHIPPED | OUTPATIENT
Start: 2024-12-03

## 2024-12-09 ENCOUNTER — CLINICAL SUPPORT (OUTPATIENT)
Dept: PRIMARY CARE CLINIC | Facility: CLINIC | Age: 69
End: 2024-12-09
Payer: MEDICARE

## 2024-12-09 DIAGNOSIS — F41.8 MIXED ANXIETY AND DEPRESSIVE DISORDER: Chronic | ICD-10-CM

## 2024-12-09 DIAGNOSIS — E61.1 IRON DEFICIENCY: ICD-10-CM

## 2024-12-09 DIAGNOSIS — R53.83 FATIGUE, UNSPECIFIED TYPE: ICD-10-CM

## 2024-12-09 DIAGNOSIS — Z12.5 PROSTATE CANCER SCREENING: ICD-10-CM

## 2024-12-09 DIAGNOSIS — R73.9 HYPERGLYCEMIA: ICD-10-CM

## 2024-12-09 DIAGNOSIS — E78.5 HYPERLIPIDEMIA, UNSPECIFIED HYPERLIPIDEMIA TYPE: ICD-10-CM

## 2024-12-09 DIAGNOSIS — R63.5 WEIGHT GAIN: ICD-10-CM

## 2024-12-09 LAB
ALBUMIN SERPL BCP-MCNC: 4.1 G/DL (ref 3.5–5.2)
ALP SERPL-CCNC: 73 U/L (ref 40–150)
ALT SERPL W/O P-5'-P-CCNC: 27 U/L (ref 10–44)
ANION GAP SERPL CALC-SCNC: 11 MMOL/L (ref 8–16)
AST SERPL-CCNC: 28 U/L (ref 10–40)
BASOPHILS # BLD AUTO: 0.04 K/UL (ref 0–0.2)
BASOPHILS NFR BLD: 0.6 % (ref 0–1.9)
BILIRUB SERPL-MCNC: 0.9 MG/DL (ref 0.1–1)
BUN SERPL-MCNC: 15 MG/DL (ref 8–23)
CALCIUM SERPL-MCNC: 9.1 MG/DL (ref 8.7–10.5)
CHLORIDE SERPL-SCNC: 109 MMOL/L (ref 95–110)
CHOLEST SERPL-MCNC: 144 MG/DL (ref 120–199)
CHOLEST/HDLC SERPL: 3 {RATIO} (ref 2–5)
CO2 SERPL-SCNC: 22 MMOL/L (ref 23–29)
COMPLEXED PSA SERPL-MCNC: 0.6 NG/ML (ref 0–4)
CREAT SERPL-MCNC: 1.1 MG/DL (ref 0.5–1.4)
DIFFERENTIAL METHOD BLD: NORMAL
EOSINOPHIL # BLD AUTO: 0.1 K/UL (ref 0–0.5)
EOSINOPHIL NFR BLD: 2 % (ref 0–8)
ERYTHROCYTE [DISTWIDTH] IN BLOOD BY AUTOMATED COUNT: 13.9 % (ref 11.5–14.5)
EST. GFR  (NO RACE VARIABLE): >60 ML/MIN/1.73 M^2
FERRITIN SERPL-MCNC: 86 NG/ML (ref 20–300)
GLUCOSE SERPL-MCNC: 95 MG/DL (ref 70–110)
HCT VFR BLD AUTO: 42.9 % (ref 40–54)
HDLC SERPL-MCNC: 48 MG/DL (ref 40–75)
HDLC SERPL: 33.3 % (ref 20–50)
HGB BLD-MCNC: 14.9 G/DL (ref 14–18)
IMM GRANULOCYTES # BLD AUTO: 0.02 K/UL (ref 0–0.04)
IMM GRANULOCYTES NFR BLD AUTO: 0.3 % (ref 0–0.5)
IRON SERPL-MCNC: 159 UG/DL (ref 45–160)
LDLC SERPL CALC-MCNC: 81.4 MG/DL (ref 63–159)
LYMPHOCYTES # BLD AUTO: 1.7 K/UL (ref 1–4.8)
LYMPHOCYTES NFR BLD: 25.3 % (ref 18–48)
MCH RBC QN AUTO: 29.8 PG (ref 27–31)
MCHC RBC AUTO-ENTMCNC: 34.7 G/DL (ref 32–36)
MCV RBC AUTO: 86 FL (ref 82–98)
MONOCYTES # BLD AUTO: 0.5 K/UL (ref 0.3–1)
MONOCYTES NFR BLD: 7.7 % (ref 4–15)
NEUTROPHILS # BLD AUTO: 4.2 K/UL (ref 1.8–7.7)
NEUTROPHILS NFR BLD: 64.1 % (ref 38–73)
NONHDLC SERPL-MCNC: 96 MG/DL
NRBC BLD-RTO: 0 /100 WBC
PLATELET # BLD AUTO: 218 K/UL (ref 150–450)
PMV BLD AUTO: 10.7 FL (ref 9.2–12.9)
POTASSIUM SERPL-SCNC: 4.2 MMOL/L (ref 3.5–5.1)
PROT SERPL-MCNC: 6.8 G/DL (ref 6–8.4)
RBC # BLD AUTO: 5 M/UL (ref 4.6–6.2)
SATURATED IRON: 43 % (ref 20–50)
SODIUM SERPL-SCNC: 142 MMOL/L (ref 136–145)
TOTAL IRON BINDING CAPACITY: 366 UG/DL (ref 250–450)
TRANSFERRIN SERPL-MCNC: 247 MG/DL (ref 200–375)
TRIGL SERPL-MCNC: 73 MG/DL (ref 30–150)
TSH SERPL DL<=0.005 MIU/L-ACNC: 2.11 UIU/ML (ref 0.4–4)
WBC # BLD AUTO: 6.52 K/UL (ref 3.9–12.7)

## 2024-12-09 PROCEDURE — 84153 ASSAY OF PSA TOTAL: CPT | Performed by: INTERNAL MEDICINE

## 2024-12-09 PROCEDURE — 85025 COMPLETE CBC W/AUTO DIFF WBC: CPT | Performed by: INTERNAL MEDICINE

## 2024-12-09 PROCEDURE — 80061 LIPID PANEL: CPT | Performed by: INTERNAL MEDICINE

## 2024-12-09 PROCEDURE — 83036 HEMOGLOBIN GLYCOSYLATED A1C: CPT | Performed by: INTERNAL MEDICINE

## 2024-12-09 PROCEDURE — 84443 ASSAY THYROID STIM HORMONE: CPT | Performed by: INTERNAL MEDICINE

## 2024-12-09 PROCEDURE — 80053 COMPREHEN METABOLIC PANEL: CPT | Performed by: INTERNAL MEDICINE

## 2024-12-09 PROCEDURE — 99999 PR PBB SHADOW E&M-EST. PATIENT-LVL I: CPT | Mod: PBBFAC,,,

## 2024-12-09 PROCEDURE — 83540 ASSAY OF IRON: CPT | Performed by: INTERNAL MEDICINE

## 2024-12-09 PROCEDURE — 99211 OFF/OP EST MAY X REQ PHY/QHP: CPT | Mod: PBBFAC,PN

## 2024-12-09 PROCEDURE — 82728 ASSAY OF FERRITIN: CPT | Performed by: INTERNAL MEDICINE

## 2024-12-09 NOTE — PROGRESS NOTES
Pt identified by name & . Reviewed allergies and medications. Labs collected and pressure dressing applied. Pt tolerated well.

## 2024-12-10 LAB
ESTIMATED AVG GLUCOSE: 105 MG/DL (ref 68–131)
HBA1C MFR BLD: 5.3 % (ref 4–5.6)

## 2024-12-15 ENCOUNTER — PATIENT MESSAGE (OUTPATIENT)
Dept: PRIMARY CARE CLINIC | Facility: CLINIC | Age: 69
End: 2024-12-15
Payer: MEDICARE

## 2024-12-16 ENCOUNTER — TELEPHONE (OUTPATIENT)
Dept: PRIMARY CARE CLINIC | Facility: CLINIC | Age: 69
End: 2024-12-16
Payer: MEDICARE

## 2024-12-16 ENCOUNTER — OFFICE VISIT (OUTPATIENT)
Dept: PSYCHIATRY | Facility: CLINIC | Age: 69
End: 2024-12-16
Payer: MEDICARE

## 2024-12-16 VITALS — HEART RATE: 89 BPM | DIASTOLIC BLOOD PRESSURE: 89 MMHG | SYSTOLIC BLOOD PRESSURE: 152 MMHG

## 2024-12-16 DIAGNOSIS — F33.1 MODERATE EPISODE OF RECURRENT MAJOR DEPRESSIVE DISORDER: ICD-10-CM

## 2024-12-16 DIAGNOSIS — F41.1 GAD (GENERALIZED ANXIETY DISORDER): Primary | ICD-10-CM

## 2024-12-16 PROCEDURE — 99212 OFFICE O/P EST SF 10 MIN: CPT | Mod: PBBFAC | Performed by: PSYCHIATRY & NEUROLOGY

## 2024-12-16 PROCEDURE — G2211 COMPLEX E/M VISIT ADD ON: HCPCS | Mod: S$PBB,,, | Performed by: PSYCHIATRY & NEUROLOGY

## 2024-12-16 PROCEDURE — 99999 PR PBB SHADOW E&M-EST. PATIENT-LVL II: CPT | Mod: PBBFAC,,, | Performed by: PSYCHIATRY & NEUROLOGY

## 2024-12-16 PROCEDURE — 99215 OFFICE O/P EST HI 40 MIN: CPT | Mod: S$PBB,,, | Performed by: PSYCHIATRY & NEUROLOGY

## 2024-12-16 RX ORDER — ESCITALOPRAM OXALATE 20 MG/1
20 TABLET ORAL DAILY
Qty: 90 TABLET | Refills: 0 | Status: SHIPPED | OUTPATIENT
Start: 2024-12-16

## 2024-12-16 RX ORDER — BUPROPION HYDROCHLORIDE 300 MG/1
300 TABLET ORAL DAILY
Qty: 90 TABLET | Refills: 0 | Status: SHIPPED | OUTPATIENT
Start: 2024-12-16

## 2024-12-16 NOTE — PROGRESS NOTES
Kishore Sosa   1955 12/16/2024        CURRENT PRESENTATION  (psychiatric biopsychosocial evaluation; plan for treatment):   Visit 08/21/2024 documentation includes:   ...PHQ scores are down to 6 from 11 for depression and stable/slightly up from 10 to 11 for anxiety.  The patient reports stably euthymia and some remaining depression.  He expresses the insight that the remaining depression is more psychological than biochemical.  No hillary, hypomania, psychosis, suicidal ideation, thoughts of self-harm, homicidal ideation, thoughts of harm towards others, or feelings of aggression.  No side effects of Lexapro or BuSpar apart from possibly some fatigue with Lexapro, for which he will move the medication to early evening.      The patient describes years of avoidance of financial issues and symptoms of anxiety attacks if he get notices that something is coming in the mail (and he indicates that the notices are consistently about packages and not financial matters), being concerned that it is the IRS or some other financial matter (his history is 1 of financial consequences, and he indicates that he was 3 years behind on filing taxes; he indicates that the notices are consistently about packages and not financial matters that the moment of anxiety was unwarranted).  He expresses insight that the issue likely should be addressed with psychotherapy.  He describes years of avoidance of financial matters, with the avoidance being reinforcing as he has avoided the anxiety for long periods of time by avoiding addressing the matters, until he gets some kind of notice that something is coming in the mail.  With discussion, his plan is to make a to do list of financial matters and have his friend begin working through the matters with him (ending the avoidance with exposure, using cognitive and behavioral techniques and support to manage the anxiety of addressing the issues).       Encounter Diagnoses   Name  Primary?    ZENA (generalized anxiety disorder) Yes    Moderate episode of recurrent major depressive disorder     PLAN:   Follow up in 2 months.    Psychiatry Medication:  Continue Lexapro 20 mg, taking the medication in the early evening.  Continue Wellbutrin  mg daily.    In the current session, depression PHQ has gone from 6 to 5, and would be even less if there were not issues with fatigue and overeating, and he feels that these are perhaps side effects of Lexapro.  Anxiety PHQ has gone from 11 to 10, in the anxiety continues to surround still not facing the financial issues.  The patient to address fatigue and overeating with an increase Wellbutrin, and he says that he is satisfied the medications helping with anxiety and plans to have hypnotherapy regarding his avoidance of financial situations and then face the situations with his friend Ata when she is available (using the support of the friend was discussed and the last visit, and the friend is willing to help, but she was out of town for 2 months in his now medically preparing for a surgery.)    No hillary, hypomania, suicidal ideation or thoughts of self-harm, homicidal ideation or thoughts of harm towards others, feelings of aggression, psychosis, side effects of Wellbutrin, or other side effects of Lexapro.    Interim history:  Living situation/supports:  The patient greatly enjoyed a visit to [facility], and he is very proud of his daughter.  He says that she has a small apartment in a very good area of [place] enjoys her dog and friends.  She has a good job but also excels in a screen writing class and comic improvisation.  She will be coming to visit for 1 week around [place], staying mother but spending time with the patient also.        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  Last visit-  His daughter has not pursued TMS yet and continues to state that she is depressed, although she is also  engaging in activities that she enjoys.  He is concerned that she has some health problems.  He intends to make explicit to her that the decision about returning to [place] to be near her mother is hers and that she should consider the whole picture, her wishes, what the stepfather is saying, the availability of resources that would assist her mother, and the fact that she is not obligated to move and would be leaving behind significant positives in [facility] if she were to return to [place].  Last visit-  The patient says that his biggest area of worry is the financial situation with his long-term.  He indicates that he will be getting social security at age 69 and will still be working to some degree; he reports that he also has a small pension and a couple of long-term plans, the balances of which he has not checked in several years.  He indicates that his plan is to have his friend look at the balances with him in the coming weeks.       The patient reports talking with his friend, a female LPC, about once weekly.  He texts with his daughter about every other week and is planning a brief trip to see her in [place] in August.  He notes that she deals with depression and TMS is being considered.  She has a very good job as an  of a group of scientific journals.  He says that her stepfather has been encouraging her to move back to [place] to help care for her mother, who has been diagnosed with mild cognitive impairment; he reports that he has given her the feedback that she should not give up her career and her life in [place].  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  Relationships:  The patient lives alone but has frequent text and telephone contact with his daughter who lives in [facility].  His daughter is not  and has no children, and he suspects that she is clements.  He reports that he has a good female  friend.  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  Medical issues:  Hyperlipidemia, aortic atherosclerosis, basal cell carcinoma left upper extremity  Nonpsychotropic Medications:  Atorvastatin   Allergies:   Review of patient's allergies indicates:  No Known Allergies  Alcohol use:  None  Other substance use:  None    Mental Status Exam:   Appearance:  Appropriately groomed  Orientation:  X4  Attitude:  Cooperative, engaged   Eye Contact:  Appropriate  Behavior:  Calm, appropriate  Speech:     Rate - WNL    Volume - WNL    Quantity - WNL    Tone - relaxed, appropriate  Pressure - no  Thought Processes:  Goal-directed  Mood:  Euthymic   Affect:  Without distress, euthymic, including ability to brighten at appropriate times  SI:  No, and no thoughts of self-harm  HI:  No, and no thoughts of harm towards others  Paranoia:  No  Delusions:  No  Hallucinations:  No  Attention:  Intact over the course of the session  Cognition:  No deficits noted over the course of the session  Insight:  Intact   Judgment:  Intact  Impulse Control:  Intact       ASSESSMENT:   Encounter Diagnoses   Name Primary?    ZENA (generalized anxiety disorder) Yes    Moderate episode of recurrent major depressive disorder      PLAN:   Follow up in 3 months.    Psychiatry Medication:  Increase Wellbutrin XL to 300 mg daily.  Continue Lexapro 20 mg daily.    The patient will call if there are side effects with the increase in Wellbutrin or if fatigue and overeating continue at 1 month.  We discussed a switch to Prozac in the fatigue and overeating not resolved with the increase Wellbutrin or there were side effects with the increased dose of Wellbutrin.  The patient has been on Prozac in the past without side effects and with efficacy for an extended  time.  Nevertheless, I reviewed with him indications, rationale risks, and side effects, including suicidal ideations, hillary, serotonin syndrome, confusion, seizures, anxiety, trouble focusing, insomnia, decreased alertness, dizziness, effects on driving and other activities requiring alertness and steadiness, GI upset, headache, sexual side effects, excessive bleeding, and others.    Reviewed with patient:  Report side effects, other problems, or questions to the psychiatrist by way of the [facility] portal, [facility], or by calling [facility] at [phone].  Messages are checked during clinic hours only.  For urgent issues outside of clinic hours, call 911 or go to an emergency department.  Follow up with primary care/MD specialist for continued monitoring of general health and wellness and any medical conditions.  Call [facility] at [phone] or use the [facility] portal if necessary for scheduling or rescheduling.  It is the responsibility of the patient to reschedule an appointment if an appointment has been canceled or missed.  Understanding was expressed; and no further concerns or questions were raised at this time.     [id number]  Total time for the patient encounter:    45 minutes.      Face-to-face time (performing a medically appropriate evaluation; education/counseling with the patient and/or accompanying person; ordering medications, labs, or referrals during the visit):   30 minutes.      Time spent in non face-to-face time was as follows:  7 minutes pre-charting and reviewing LABP , portions of previous behavioral health encounters in the record, and portions of the interim [facility] medical information in the available record  0 minutes placing orders outside of face-to-face time  8 minutes completing documentation of clinical information in the health record, outside of face-to-face time        Large portions of this note were completed by way of voice recognition  dictation software, and transcription errors are possible, such that specific information in the note should be considered in the context of the entire report.

## 2024-12-16 NOTE — TELEPHONE ENCOUNTER
Pt viewed results via [employer].          ----- Message from Deanna Sebastian MD sent at 12/15/2024  9:54 AM CST -----  Pt has [facility] - if message below not viewed please call w information below:   Good day Mr. Sosa    Apologies for the delay in getting back with you about your labwork:  Basically, labs look good!  Are you taking iron supplement? If so, recommend cutting back a bit on what you're taking with iron levels higher end of normal now.  What are your thoughts on increasing the atorvastatin?  Given calculated ASCVD risk score of 15% (15% risk heart attack or stroke in next 10 years) would like to see LDL < 70.     Please message or call with any questions or concerns!  Thank you!  Deanna Sebastian MD, MPH  [facility]

## 2024-12-26 NOTE — PROGRESS NOTES
Kishore Sosa  2024  [id number]    Deanna Sebastian MD  Patient Care Team:  Deanna Sebastian MD as PCP - General (Internal Medicine)    Visit Type: Follow-up    Mr. Kishore Sosa is a 69 year old male here for scheduled f/u.      Medical issues include anxiety/depression, h/o asthma, h/o PE, h/o basal cell carcinoma, chronic L knee pain.    C/o R lower back pain - has been present as intermittent ache x 2 years but more acute shooting pain noted past month mostly when sleeping    C/o chest pain - notices when moving after sitting hunched, sometimes when sleeping - relieved w acetaminophen? ibuprofen?  Not affected by food or drink. Not exacerbated by exertion.    History of Present Illness    CHIEF COMPLAINT:  Mr. Sosa presents today for follow-up regarding right lower back pain and chest pain.    MUSCULOSKELETAL PAIN:  He experiences lower back pain at the lumbosacral junction, present for at least two years. Pain occurs during sleep particularly when moving right leg over left and causes discomfort with walking or jogging. Chronic knee pain.      CHEST PAIN:  He reports chest pain described as heartburn-like, which worsens with poor posture and occasionally occurs during sleep. Pain improves with acetaminophen (or ibuprofen?).    CURRENT MEDICATIONS:  He continues atorvastatin with resolution of initial myalgias with recent increase in dose.    FAMILY HISTORY:  Mother has history of DM2 and  of kidney failure.    PREVENTIVE CARE:  He received influenza and COVID-19 vaccines in September at [facility].       PHQ-4 Score: 1     From LOV w me 10/01/24  WEIGHT MANAGEMENT AND EXERCISE:  He reports difficulty losing weight despite efforts to increase physical activity. He has gained weight since stopping jogging due to knee issues. He is attempting to increase walking frequency and intensity. We discussed running on grass surfaces or a rubberized track to mitigate knee impact. Regarding diet, he is  trying to cut back on sweets, increase lean protein in his diet. He is contemplating keeping a food diary for more accurate calorie tracking.  KNEE ARTHRITIS:  He reports moderate to severe arthritis in the knee, diagnosed by a sports doctor. He uses a knee brace while walking. He denies pain during activity, including jogging, but experiences pain afterwards.  CARDIOVASCULAR:  He reports his heart rate usually runs in the 60s, noting that his current heart rate of 50 bpm is unusually low. He denies any symptoms associated with the low heart rate. He has a history of premature ventricular contractions (PVCs), experiencing them occasionally but denies that they are bothersome.  MEDICATIONS:  He changed the timing of Lexapro administration from morning to evening as suggested, noting less daytime sleepiness. He experiences vivid, colorful, and realistic dreams as a side effect, sometimes causing confusion upon waking but he does not find these dreams distressing.  DERMATOLOGY:  He recently underwent Mohs surgery for two basal cell carcinomas and one squamous cell carcinoma on his nose and left upper shoulder. The nose surgical site is healing gradually. He has noticed a new suspicious spot and has scheduled a follow-up visit with Dr. Ely in a few months.  FOOT ISSUES:  He reports a history of recurrent toe fungus that initially improved with topical treatment. The condition does not cause pain. He expresses interest in treatment options but is concerned about potential liver impacts of oral antifungal medications.  VACCINATIONS:  He has received both flu and COVID vaccines for the current year. He is due for tetanus, RSV, and the second dose of the shingles vaccine.     Recent appointments:   12/16/24 [facility]    Upcoming appointments:  Future Appointments       Date Provider Specialty Appt Notes    1/23/2025 Leesa Ramirez PA-C Dermatology 4 month f/u    1/24/2025  Primary Care fasting labs     3/20/2025 José Baca MD Psychiatry f/u    6/13/2025 Deanan Sebastian MD Primary Care Six month f/u           The following were reviewed: Active problem list, medication list, allergies, family history, social history, and Health Maintenance.     Medications have been reviewed and reconciled with patient at visit today.    Exam: sat 98%  Vitals:    12/27/24 1356   BP: 124/62   Pulse: 67         Body mass index is 27.35 kg/m².    BP Readings from Last 3 Encounters:   12/27/24 124/62   12/16/24 (!) 152/89   10/01/24 126/72        Wt Readings from Last 3 Encounters:   10/01/24 1558 84 kg (185 lb 3 oz)   08/13/24 0903 81.2 kg (179 lb 0.2 oz)   03/06/24 1118 80.8 kg (178 lb 2.1 oz)        Physical Exam  Vitals reviewed.   Constitutional:       General: He is not in acute distress.     Appearance: Normal appearance. He is not ill-appearing.   HENT:      Head: Normocephalic and atraumatic.      Right Ear: Tympanic membrane, ear canal and external ear normal.      Left Ear: Tympanic membrane, ear canal and external ear normal.      Nose: Nose normal.      Mouth/Throat:      Mouth: Mucous membranes are moist.      Pharynx: Oropharynx is clear. No oropharyngeal exudate or posterior oropharyngeal erythema.   Eyes:      Extraocular Movements: Extraocular movements intact.      Conjunctiva/sclera: Conjunctivae normal.      Comments: glasses   Neck:      Vascular: No carotid bruit.   Cardiovascular:      Rate and Rhythm: Normal rate and regular rhythm.      Heart sounds: No murmur heard.  Pulmonary:      Effort: Pulmonary effort is normal. No respiratory distress.      Breath sounds: No wheezing, rhonchi or rales.   Abdominal:      General: Abdomen is flat. Bowel sounds are normal.      Palpations: Abdomen is soft.      Tenderness: There is no abdominal tenderness. There is no guarding.   Musculoskeletal:      Right lower leg: No edema.      Left lower leg: No edema.   Lymphadenopathy:      Cervical: No cervical  "adenopathy.   Skin:     General: Skin is warm and dry.   Neurological:      Mental Status: He is alert and oriented to person, place, and time. Mental status is at baseline.      Coordination: Coordination normal.      Gait: Gait normal.   Psychiatric:         Mood and Affect: Mood normal.         Behavior: Behavior normal.         Thought Content: Thought content normal.         Judgment: Judgment normal.       Laboratory Reviewed  Lab Results   Component Value Date    WBC 6.52 12/09/2024    HGB 14.9 12/09/2024    HCT 42.9 12/09/2024     12/09/2024    MCV 86 12/09/2024    CHOL 144 12/09/2024    TRIG 73 12/09/2024    HDL 48 12/09/2024    LDLCALC 81.4 12/09/2024    ALT 27 12/09/2024    AST 28 12/09/2024     12/09/2024    K 4.2 12/09/2024     12/09/2024    CREATININE 1.1 12/09/2024    BUN 15 12/09/2024    CO2 22 (L) 12/09/2024    MG 2.1 10/24/2022    TSH 2.107 12/09/2024    FREET4 0.96 12/07/2023    PSA 0.60 12/09/2024    HGBA1C 5.3 12/09/2024     Lab Results   Component Value Date    CALCIUM 9.1 12/09/2024    No results found for: "JOAZOOPD09"No results found for: "FOLATE"   Lab Results   Component Value Date    IRON 159 12/09/2024    TRANSFERRIN 247 12/09/2024    TIBC 366 12/09/2024    FESATURATED 43 12/09/2024      Lab Results   Component Value Date    EGFRNORACEVR >60.0 12/09/2024    ALBUMIN 4.1 12/09/2024   No results found for: "GJNJXBZQ09GY"       Assessment:   69 y.o. male with multiple co-morbid illnesses here for continued follow up of medical problems.      The primary encounter diagnosis was Right-sided low back pain without sciatica, unspecified chronicity. Diagnoses of Hyperlipidemia, unspecified hyperlipidemia type and Anterior chest wall pain were also pertinent to this visit.      Plan:   1. Right-sided low back pain without sciatica, unspecified chronicity  -     Ambulatory Referral/Consult to Physical Therapy; Future; Expected date: 01/03/2025    2. Hyperlipidemia, unspecified " hyperlipidemia type  Overview:  12/27/24 intermediate ASCVD risk score of 14.9% (improved)    Assessment & Plan:  Tolerating increased statin dose - f/u repeat lipid panel in about 1 mos - encourage stay mobile and active - consider add CoQ10?    Orders:  -     Lipid Panel; Future; Expected date: 01/27/2025    3. Anterior chest wall pain  Assessment & Plan:  Suspect musculoskeletal given description - cont monitor for possible cardiac or GI issues causing or contributing            Health Maintenance         Date Due Completion Date    TETANUS VACCINE Never done ---    Colorectal Cancer Screening Never done ---    RSV Vaccine (Age 60+ and Pregnant patients) (1 - Risk 60-74 years 1-dose series) Never done ---    Shingles Vaccine (2 of 2) 09/06/2024 7/12/2024    COVID-19 Vaccine (4 - 2024-25 season) 11/19/2024 9/24/2024    PROSTATE-SPECIFIC ANTIGEN 12/09/2025 12/9/2024    High Dose Statin 12/27/2025 12/27/2024    Hemoglobin A1c (Diabetic Prevention Screening) 12/09/2027 12/9/2024    Lipid Panel 12/09/2029 12/9/2024            -Patient's lab results were reviewed and discussed with patient  -Treatment options and alternatives were discussed with the patient. Patient expressed understanding. Patient was given the opportunity to ask questions and be an active participant in their medical care. Patient had no further questions or concerns at this time.     Follow up: Follow up in about 6 months (around 6/27/2025) for Follow Up w me.    Care Plan/Goals: Reviewed Yes   Goals         look into volunteer opportunities (pt-stated)       Achievable - within patient's control: Yes    Difficulties Identified: finding right fit - time!    Plan for Overcoming difficulties: research - word of mouth     Timeframe for completion: 6 mos                    After visit summary printed and given to patient upon discharge.  Patient goals and care plan are included in After visit summary.    TOTAL TIME evaluating and managing this patient  for this encounter was 43 minutes. This time was spent personally by me on some of the following activities: review of patient's past medical history, assessing age-appropriate health maintenance needs, review of any interval history, review and interpretation of lab results, review and interpretation of imaging test results, review and interpretation of cardiology test results, reviewing consulting specialist notes, obtaining history from the patient and family, examination of the patient, medication reconciliation, managing and/or ordering prescription medications, ordering imaging tests, ordering referral to subspecialty provider(s), educating patient and answering their questions about diagnosis, treatment plan, and goals of treatment, discussing planned follow-up and final documentation of the visit. This time was exclusive of any separately billable procedures for this patient and exclusive of time spent treating any other patients.     This note was generated with the assistance of ambient listening technology. Verbal consent was obtained by the patient and accompanying visitor(s) for the recording of patient appointment to facilitate this note. I attest to having reviewed and edited the generated note for accuracy, though some syntax or spelling errors may persist. Please contact the author of this note for any clarification.

## 2024-12-27 ENCOUNTER — OFFICE VISIT (OUTPATIENT)
Dept: PRIMARY CARE CLINIC | Facility: CLINIC | Age: 69
End: 2024-12-27
Payer: MEDICARE

## 2024-12-27 ENCOUNTER — RESEARCH ENCOUNTER (OUTPATIENT)
Dept: RESEARCH | Facility: HOSPITAL | Age: 69
End: 2024-12-27
Payer: MEDICARE

## 2024-12-27 VITALS
BODY MASS INDEX: 27.35 KG/M2 | HEART RATE: 67 BPM | SYSTOLIC BLOOD PRESSURE: 124 MMHG | OXYGEN SATURATION: 98 % | HEIGHT: 69 IN | DIASTOLIC BLOOD PRESSURE: 62 MMHG

## 2024-12-27 DIAGNOSIS — M54.50 RIGHT-SIDED LOW BACK PAIN WITHOUT SCIATICA, UNSPECIFIED CHRONICITY: Primary | ICD-10-CM

## 2024-12-27 DIAGNOSIS — R07.89 ANTERIOR CHEST WALL PAIN: ICD-10-CM

## 2024-12-27 DIAGNOSIS — E78.5 HYPERLIPIDEMIA, UNSPECIFIED HYPERLIPIDEMIA TYPE: Chronic | ICD-10-CM

## 2024-12-27 PROCEDURE — 99213 OFFICE O/P EST LOW 20 MIN: CPT | Mod: PBBFAC,PN | Performed by: INTERNAL MEDICINE

## 2024-12-27 PROCEDURE — 99999 PR PBB SHADOW E&M-EST. PATIENT-LVL III: CPT | Mod: PBBFAC,,, | Performed by: INTERNAL MEDICINE

## 2024-12-27 NOTE — ASSESSMENT & PLAN NOTE
Tolerating increased statin dose - f/u repeat lipid panel in about 1 mos - encourage stay mobile and active - consider add CoQ10?

## 2024-12-27 NOTE — ASSESSMENT & PLAN NOTE
Suspect musculoskeletal given description - cont monitor for possible cardiac or GI issues causing or contributing

## 2024-12-27 NOTE — PATIENT INSTRUCTIONS
If you are feeling unwell, we'd like to be the first ones to know here at [facility]! Please give us a call. Same day appointments are our top priority to keep you well and out of the emergency rooms and hospitals. Call [phone] for our direct line. After hours advice is always available. Please call [phone] after hours to speak to the on-call team.      Recommend Tetanus and Shingles vaccines that can be scheduled at your pharmacy of choice.    Let us know what you've been taking that relieves the chest pain    Consider add CoQ10 while taking statin     Recommend try sleeping with pillow between legs if sleeping on your side

## 2024-12-27 NOTE — PROGRESS NOTES
Study Title: LOKI: Real-world Evidence to Advance Multi-Cancer Early Detection Health Equity (REACH/Galleri-Medicare study)  Protocol IRB #: [id number]  IRB Approval Date: 02 July 2024  Sponsor: [employer]  : Torsten Sanford MD    Patient eligibility was checked prior to enrollment in the study. Patient met the following inclusion and exclusion criteria:     INCLUSION CRITERIA  Participant age is 50 years or older with Medicare coverage at the time of signing the Informed Consent form  Participant is eligible to receive the Galleri test, based on a determination by the study investigator or designee  Participant is capable of giving signed Informed Consent that is legally effective (consent provided by LAR is not permitted)  Participant is able to comprehend and respond to questions in participant questionnaires.    EXCLUSION CRITERIA  Participant having had a previous Galleri test not associated with this study  Participant is undergoing or referred for diagnostic evaluation due to clinical suspicion for cancer  Participant has a personal history of invasive or hematologic malignancy, diagnosed within the last 3 years prior to expected enrollment date or diagnosed greater than 3 years prior to expected enrollment date and never treated  Participant has had definitive treatment for invasive or hematologic malignancy within the 3 years prior to expected enrollment date  Participant is not able to comply with protocol procedures  Participant is not a current patient at a participating center  Participant is currently enrolled or was previously enrolled in another [facility]-sponsored study  Participant is current or previous employee/contractor of [employer]  Participant is currently pregnant (by participant's self-report of pregnancy status)    DOCUMENTATION OF INFORMED CONSENT    Prior to the Informed Consent (IC) being signed, or any study protocol required data collection, testing, procedure, or intervention  being performed, the following was done and/or discussed:  Patient was given a copy of the IC for review   Purpose of the study and qualifications to participate   Study design, Follow up schedule, and tests or procedures done at each visit  Confidentiality and HIPAA Authorization for Release of Medical Records for the research trial/ subject's rights/research related injury  Risk, Benefits, Alternative Treatments, Compensation and Costs  Participation in the research trial is voluntary and patient may withdraw at anytime  Contact information for study related questions    Patient verbalizes understanding of the above: Yes  Contact information for CRC and PI given to patient: Yes  Patient able to adequately summarize: the purpose of the study, the risks associated with the study, and all procedures, testing, and follow-ups associated with the study: Yes    Patient signed the informed consent form for the research study with an IRB approval date of 02 July 2024. Each page of the consent form was reviewed with patient and all questions answered satisfactorily. Patient received a copy of the consent form. The original consent was scanned into electronic medical records.    INSURANCE VERIFICATION    Thoroughly discussed with patient that the study team does not expect them to be billed for this test. However, by participating in this trial, we cannot guarantee that they will not receive a bill, as cost ultimately depends on the details of their Medicare coverage. Patient voiced understanding.      BLOOD DRAW    Following IC being signed and prior to blood draw, patient completed all baseline/pretest questionnaires. The following specimens were collected from the pt at the time of this encounter via peripheral blood draw.    Blood draw location: Left Arm  Needle used: 21 g needle  Blood draw amount: 40ml  Blood draw time: 1345 12/27/2024   [employer] id [id number]

## 2025-01-10 ENCOUNTER — TELEPHONE (OUTPATIENT)
Dept: RESEARCH | Facility: HOSPITAL | Age: 70
End: 2025-01-10
Payer: MEDICARE

## 2025-01-10 NOTE — TELEPHONE ENCOUNTER
Study Title: LOKI: Real-world Evidence to Advance Multi-Cancer Early Detection Health Equity (LOKI/[employer]que-Medicare study)  Protocol IRB #: [id number]  IRB Approval Date: 02 July 2024  Sponsor: [employer]  : Torsten Sanford MD     [facility] ID: [id number]     Results of the [employer] [facility] Multi Cancer Early Detection test and were reviewed by PI Dr Sanford. Patient was called and notified of test results, there was no signal detected.     Patient reminded, this was a screening test, so we still highly encourage them to continue other normal health screenings  and to continue to adhere to guideline-recommended cancer screenings.     Patient was asked about completing follow-up questionnaire online and was agreeable.

## 2025-01-14 NOTE — PATIENT INSTRUCTIONS
If you are feeling unwell, we'd like to be the first ones to know here at [facility]! Please give us a call. Same day appointments are our top priority to keep you well and out of the emergency rooms and hospitals. Call [phone] for our direct line. After hours advice is always available. Please call [phone] after hours to speak to the on-call team.      Recommend Tetanus, Shingles, and Covid and RSV vaccines that can be scheduled at your pharmacy of choice.     Try to get out and walk in the morning to get natural am light   If can walk outside a while before sunset as well even better    Let us know if interested in meeting w [facility] for evaluation and recommendations for issues w balance and L knee    passive SI only no SI currently

## 2025-01-15 ENCOUNTER — CLINICAL SUPPORT (OUTPATIENT)
Dept: REHABILITATION | Facility: HOSPITAL | Age: 70
End: 2025-01-15
Payer: MEDICARE

## 2025-01-15 DIAGNOSIS — M54.50 RIGHT-SIDED LOW BACK PAIN WITHOUT SCIATICA, UNSPECIFIED CHRONICITY: ICD-10-CM

## 2025-01-15 DIAGNOSIS — M53.86 DECREASED RANGE OF MOTION OF LUMBAR SPINE: Primary | ICD-10-CM

## 2025-01-15 PROCEDURE — 97161 PT EVAL LOW COMPLEX 20 MIN: CPT | Mod: PN

## 2025-01-15 PROCEDURE — 97112 NEUROMUSCULAR REEDUCATION: CPT | Mod: PN

## 2025-01-15 PROCEDURE — 97530 THERAPEUTIC ACTIVITIES: CPT | Mod: PN

## 2025-01-15 PROCEDURE — 97140 MANUAL THERAPY 1/> REGIONS: CPT | Mod: PN

## 2025-01-15 PROCEDURE — 20561 NDL INSJ W/O NJX 3+ MUSC: CPT | Mod: PN

## 2025-01-15 NOTE — PLAN OF CARE
[facility]   Physical Therapy Initial Evaluation        Date: 1/15/2025   Name: Kishore Sosa  Clinic Number: [id number]    Therapy Diagnosis:   Encounter Diagnoses   Name Primary?    Right-sided low back pain without sciatica, unspecified chronicity     Decreased range of motion of lumbar spine Yes     Physician: Deanna Sebastian MD    Physician Orders: PT Eval and Treat   Medical Diagnosis from Referral: Right-sided low back pain without sciatica, unspecified chronicity [M54.50]   Evaluation Date: 1/15/2025  Authorization Period Expiration: 12/27/25  Plan of Care Expiration: 3/14/25  Progress Note Due: 2/15/25  Visit # / Visits authorized: 1/ 1     Foto  Date  Score    #1/3  83%   #2/3     #3/3         Precautions:  hx PE      Time In: 10:05 am  Time Out: 11:05 am  Total Appointment Time (timed & untimed codes): 60 minutes      SUBJECTIVE     Date of onset: chronic with recent acute flare up     History of current condition - Kishore reports: R sided low back pain that has gotten better since it initially began. Pt reports chronic ache on right side of back for a couple of years, but recently had a sharp pain when pulling his knee up in bed about a month ago. Has gotten better since then. Pt is not currently running or walking for exercise but does feel it with this and feels back stiffness when getting out of bed first thing in am.     Current Activity Level: works full time, usually walks/runs in the evening; not now with the cold    Falls: no      Prior Therapy: no  Social History: Pt lives alone  Occupation:   Prior Level of Function: Ind  Current Level of Function: Ind    Pain:  Current: 0 / 10, Worst: 7 / 10, Best: 0 / 10   Location: right low back  Description: Aching  Aggravating Factors: Morning and Getting out of bed/chair, jogging  Easing Factors: rest    Patients goals: decrease the back pain     Medical History:   Past Medical History:   Diagnosis Date     Anxiety     Asthma     childhood    Basal cell carcinoma (BCC) of skin of left upper extremity including shoulder 07/03/2024    Basal cell carcinoma (BCC) of skin of right upper extremity including shoulder 07/03/2024    Depression     Hyperlipidemia     Moderate episode of recurrent major depressive disorder 10/24/2022    Pulmonary embolism        Surgical History:   Kishore Sosa  has a past surgical history that includes Cholecystectomy.    Medications:   Kishore UP has a current medication list which includes the following prescription(s): atorvastatin, bupropion, and escitalopram oxalate.    Allergies:   Review of patient's allergies indicates:  No Known Allergies       OBJECTIVE         Active Range of Motion:  Lumbar   Action Left Right   Flexion 50%    Extension 75%    Sidebending 100% 75% (pain)   Rotation  75% 75%         Manual Muscle Testing:  Hip   Action Left Right   Flexion 5 / 5 4+ / 5   Extension 4+ / 5 4+ / 5   Abduction 4+ / 5 4 / 5 (pain)       Special Tests:    Negative (-) Tests:  Slump test  Timed Up and Go = 6.74 seconds  30 Second Sit to Stand = 16 reps      Balance:  Single Limb Stance:  Left / Eyes Open / Firm = 10+ seconds  Right / Eyes Open / Firm = 10+ seconds       Functional Movement & Mechanics:  Functional squat: quad dominant, decreased ankle DF (knee pain no back pain)      Palpation and Additional Assessment:  Mild tenderness R sided lumbar ES      Intake Outcome Measure for FOTO Lumbar Survey    Therapist reviewed FOTO scores for Kishore Sosa on 1/15/2025.   FOTO documents entered into [employer] - see Media section or FOTO account episode details.    Intake Score: 83%         TREATMENT     Total Treatment time (time-based codes) separate from Evaluation: 40 minutes     Kishore received the treatments listed below:      Neuromuscular re-education activities to improve: Coordination, Proprioception, and Posture for 10 minutes. The following activities were  included:    Intervention 1/15/2025  Parameters   Cat/cow [x]    Prone swimmers [x]    Sidelying hip abduction [x]    bridge [x]     []     []         Manual Therapy Techniques: Soft tissue Mobilization were applied to the: lumbar spine for 20 minutes, including:    Manual Intervention 1/15/2025     Soft Tissue Mobilization [x] right  lumbar erectors   Functional Dry Needling  [x] Right lumbar multifidi (L2-5)             Therapeutic Activities to improve functional performance for 10  minutes, including:    Intervention 1/15/2025  Parameters   Sit to stand [x]    HEP review/ dry needling info [x]     []     []     []     []            PATIENT EDUCATION AND HOME EXERCISES     Education provided:   Patient  was instructed in home exercise program  to address the deficits listed above and to address overall condition and quality of life.  Patient  was encouraged to participate in cardiovascular exercise and wellness exercise in fitness center with assistance of health  at [address].   Patient was educated on all the above exercise prior/during/after for proper posture, positioning, and execution for safe performance with home exercise program.    Written Home Exercises Provided: Patient instructed to cont prior HEP. Exercises were reviewed and Kishore was able to demonstrate them prior to the end of the session.  Kishore demonstrated good  understanding of the education provided. See EMR under Patient Instructions for exercises provided during therapy sessions.    ASSESSMENT     Kishore UP is a 69 y.o. male referred to outpatient Physical Therapy with a medical diagnosis of right-sided low back pain without sciatica . The patient presents with signs and symptoms consistent with diagnosis and impairments which include weakness, decreased coordination, pain, and decreased ROM.    Patient prognosis is Excellent.   Patient will benefit from skilled outpatient Physical Therapy to address the deficits stated  above and in the chart below, provide patient /family education, and to maximize patientt's level of independence.     Plan of care discussed with patient: Yes  Patient's spiritual, cultural and educational needs considered and patient is agreeable to the plan of care and goals as stated below:     Anticipated Barriers for therapy: none    Medical Necessity is demonstrated by the following   History  Co-morbidities and personal factors that may impact the plan of care [x] LOW: no personal factors / co-morbidities  [] MODERATE: 1-2 personal factors / co-morbidities  [] HIGH: 3+ personal factors / co-morbidities    Moderate / High Support Documentation:   Past Medical History:   Diagnosis Date    Anxiety     Asthma     childhood    Basal cell carcinoma (BCC) of skin of left upper extremity including shoulder 07/03/2024    Basal cell carcinoma (BCC) of skin of right upper extremity including shoulder 07/03/2024    Depression     Hyperlipidemia     Moderate episode of recurrent major depressive disorder 10/24/2022    Pulmonary embolism          Examination  Body Structures and Functions, activity limitations and participation restrictions that may impact the plan of care [] LOW: addressing 1-2 elements  [] MODERATE: 2+ elements  [x] HIGH: 3+ elements (please support below)    Moderate / High Support Documentation: see evaluation/objective measurements above.     Clinical Presentation [x] LOW: stable  [] MODERATE: Evolving  [] HIGH: Unstable     Decision Making/ Complexity Score: low       Goals:  Number Goal Progress    Patient will be independent with self management of his/her condition with home exercise program, posture, positioning and improved body mechanics. Progressing - 1/15/2025    Patient will safely transition to and participate in wellness/fitness program. Progressing - 1/15/2025    Patient will report 0/10 pain at worst over a two week period. Progressing - 1/15/2025       PLAN   Plan of care  Certification: 1/15/2025 to 3/14/25.    Outpatient Physical Therapy 1 times/  6 week(s) to include the following interventions: Electrical Stimulation FDN, Manual Therapy, Neuromuscular Re-ed, Patient Education, Therapeutic Activities, and Therapeutic Exercise to establish safe and effective exercise program that patient can perform independently. Health  will contact patient either by phone or in person weekly to monitor exercise program, answer questions regarding exercises and encourage follow up in fitness center. Health  will communicate any concerns with treating therapist and will recommend follow up with physical therapist as needed.    Macrina Lackey, PT

## 2025-01-24 ENCOUNTER — TELEPHONE (OUTPATIENT)
Dept: DERMATOLOGY | Facility: CLINIC | Age: 70
End: 2025-01-24
Payer: MEDICARE

## 2025-01-24 NOTE — TELEPHONE ENCOUNTER
Return call made to patient regarding appointment rescheduling. Spoke to patient to get appointment rescheduled from 1/23/25. Appt scheduled for Monday, 1/27/25 at 1:20 pm with Leesa Ramirez PA-C at the [facility].    ----- Message from Marker[place] sent at 1/22/2025  2:31 PM CST -----  Contact: Tami  Type:  Patient Returning Call    Who Called:tami  Who Left Message for Patient:nurse  Does the patient know what this is regarding?:missed call  Would the patient rather a call back or a response via [facility]? call  Best Call Back Number:[phone]   Additional Information: r/s

## 2025-01-29 ENCOUNTER — CLINICAL SUPPORT (OUTPATIENT)
Dept: REHABILITATION | Facility: HOSPITAL | Age: 70
End: 2025-01-29
Payer: MEDICARE

## 2025-01-29 DIAGNOSIS — M53.86 DECREASED RANGE OF MOTION OF LUMBAR SPINE: Primary | ICD-10-CM

## 2025-01-29 PROCEDURE — 97112 NEUROMUSCULAR REEDUCATION: CPT | Mod: PN

## 2025-01-29 PROCEDURE — 97110 THERAPEUTIC EXERCISES: CPT | Mod: PN

## 2025-01-29 PROCEDURE — 97530 THERAPEUTIC ACTIVITIES: CPT | Mod: PN

## 2025-01-29 PROCEDURE — 97140 MANUAL THERAPY 1/> REGIONS: CPT | Mod: PN

## 2025-01-29 NOTE — PROGRESS NOTES
[facility]   Physical Therapy Treatment Note      Name: Kishore Sosa  Clinic Number: [id number]    Therapy Diagnosis:   Encounter Diagnosis   Name Primary?    Decreased range of motion of lumbar spine Yes     Physician: Deanna Sebastian MD    Visit Date: 1/29/2025    Physician Orders: PT Eval and Treat   Medical Diagnosis from Referral: Right-sided low back pain without sciatica, unspecified chronicity [M54.50]   Evaluation Date: 1/15/2025  Authorization Period Expiration: 12/27/25  Plan of Care Expiration: 3/14/25  Progress Note Due: 2/15/25  Visit # / Visits authorized: 1/ 1      Foto  Date  Score    #1/3   83%   #2/3       #3/3          Precautions:  hx PE       Time In: 9:55 am  Time Out: 10:50 am  Total Appointment Time (timed & untimed codes): 55 minutes      Subjective     Patient reports: low back is much better; not having the same pain. Has some discomfort in hamstring area  He was compliant with home exercise program.  Response to previous treatment: decreased pain  Functional change: none yet    Pain: 0/10  Location: right back      Objective      Objective Measures updated at progress report unless specified.     Treatment     Kishore received the treatments listed below:      therapeutic exercises to develop strength and endurance for 10 minutes including:    Treadmill 6' at 3.8 mph  Josiah pose stretch     Neuromuscular re-education activities to improve: Coordination, Proprioception, and Posture for 15 minutes. The following activities were included:     Intervention 1/15/2025  Parameters   Cat/cow [x]  10x    Prone swimmers [x]  2x10    Sidelying hip abduction [x]  2x10 B    Bridge  [x]  On swiss ball    HS curls [x]  On swiss ball    Seated twist [x]  8# med ball           Manual Therapy Techniques: Soft tissue Mobilization were applied to the: lumbar spine for 20 minutes, including:     Manual Intervention 1/15/2025      Soft Tissue Mobilization [x]  right   lumbar erectors   Functional Dry Needling  [x]  Right lumbar multifidi (L2-5) and right hamstrings with stim                 Therapeutic Activities to improve functional performance for 10 minutes, including:     Intervention 1/15/2025  Parameters   Squat to bench [x]  2x10 10#   Dead lift [x]  2x10 25#     []        []        []        []          Patient Education and Home Exercises       Education provided:   Patient  was instructed in home exercise program  to address the deficits listed above and to address overall condition and quality of life.  Patient  was encouraged to participate in cardiovascular exercise and wellness exercise in fitness center with assistance of health  at [address].   Patient was educated on all the above exercise prior/during/after for proper posture, positioning, and execution for safe performance with home exercise program.     Written Home Exercises Provided: Patient instructed to cont prior HEP. Exercises were reviewed and Kishore was able to demonstrate them prior to the end of the session.  Kishore demonstrated good  understanding of the education provided. See EMR under Patient Instructions for exercises provided during therapy sessions.    Assessment     Pt tolerated treatment session well today with no adverse effects. Pt able to add more strengthening activities with no reports of pain. Moderate cueing for correct form. Added hamstring needling today to address pain complaints here.     Kishore Is progressing well towards his goals.   Patient prognosis is Excellent.     Patient will continue to benefit from skilled outpatient physical therapy to address the deficits listed in the problem list box on initial evaluation, provide pt/family education and to maximize pt's level of independence in the home and community environment.     Patient's spiritual, cultural and educational needs considered and pt agreeable to plan of care and goals.     Anticipated barriers  to physical therapy: none    Goals:  Number Goal Progress     Patient will be independent with self management of his/her condition with home exercise program, posture, positioning and improved body mechanics. Progressing - 1/15/2025     Patient will safely transition to and participate in wellness/fitness program. Progressing - 1/15/2025     Patient will report 0/10 pain at worst over a two week period. Progressing - 1/15/2025       Plan     Continue with current plan.    Macrina Lackey, PT

## 2025-02-12 ENCOUNTER — CLINICAL SUPPORT (OUTPATIENT)
Dept: REHABILITATION | Facility: HOSPITAL | Age: 70
End: 2025-02-12
Payer: MEDICARE

## 2025-02-12 DIAGNOSIS — M53.86 DECREASED RANGE OF MOTION OF LUMBAR SPINE: Primary | ICD-10-CM

## 2025-02-12 PROCEDURE — 97530 THERAPEUTIC ACTIVITIES: CPT | Mod: PN

## 2025-02-12 PROCEDURE — 97110 THERAPEUTIC EXERCISES: CPT | Mod: PN

## 2025-02-12 PROCEDURE — 97112 NEUROMUSCULAR REEDUCATION: CPT | Mod: PN

## 2025-02-12 PROCEDURE — 97140 MANUAL THERAPY 1/> REGIONS: CPT | Mod: PN

## 2025-02-12 NOTE — PROGRESS NOTES
"[facility]   Physical Therapy Treatment Note      Name: Kishore Sosa  Clinic Number: [id number]    Therapy Diagnosis:   Encounter Diagnosis   Name Primary?    Decreased range of motion of lumbar spine Yes       Physician: Deanna Sebastian MD    Visit Date: 2/12/2025    Physician Orders: PT Eval and Treat   Medical Diagnosis from Referral: Right-sided low back pain without sciatica, unspecified chronicity [M54.50]   Evaluation Date: 1/15/2025  Authorization Period Expiration: 12/27/25  Plan of Care Expiration: 3/14/25  Progress Note Due: 2/15/25  Visit # / Visits authorized: 3/20     Foto  Date  Score    #1/3   83%   #2/3       #3/3          Precautions:  hx PE       Time In: 10:00 am  Time Out: 10:53 am  Total Appointment Time (timed & untimed codes): 53 minutes      Subjective     Patient reports: he felt great for about a week and a half but has been having a new, nerve type pain in the back of his leg. Not constant, mainly at night when trying to sleep. Will wake him up from sleep.       He was compliant with home exercise program.  Response to previous treatment: decreased pain  Functional change: none yet    Pain: 0/10  Location: right back      Objective      Objective Measures updated at progress report unless specified.     Treatment     Kishore received the treatments listed below:      therapeutic exercises to develop strength and endurance for 10 minutes including:    Treadmill 5' at 3.8 mph  Josiah pose stretch   Prone press ups 10x     Neuromuscular re-education activities to improve: Coordination, Proprioception, and Posture for 20 minutes. The following activities were included:     Intervention 1/15/2025  Parameters   Cat/cow [x]  10x    Prone swimmers [x]  2x10    Front plank [x]  30" 2x   Bridge  [x]  On swiss ball    HS curls [x]  On swiss ball    Nerve glides  [x]  supine          Manual Therapy Techniques: Soft tissue Mobilization were applied to the: lumbar " spine for 15 minutes, including:     Manual Intervention 1/15/2025      Soft Tissue Mobilization [x]  right  lumbar erectors   Functional Dry Needling  [x]  Right lumbar multifidi (L4-5) with stim         R hip LAD        Therapeutic Activities to improve functional performance for 8 minutes, including:     Intervention 1/15/2025  Parameters   Squat to bench [x]  2x10 10#   Dead lift [x]  1x10 25#     []        []        []        []          Patient Education and Home Exercises       Education provided:   Patient  was instructed in home exercise program  to address the deficits listed above and to address overall condition and quality of life.  Patient  was encouraged to participate in cardiovascular exercise and wellness exercise in fitness center with assistance of health  at [address].   Patient was educated on all the above exercise prior/during/after for proper posture, positioning, and execution for safe performance with home exercise program.     Written Home Exercises Provided: Patient instructed to cont prior HEP. Exercises were reviewed and Kishore was able to demonstrate them prior to the end of the session.  Kishore demonstrated good  understanding of the education provided. See EMR under Patient Instructions for exercises provided during therapy sessions.    Assessment     Pt tolerated treatment session well today with no adverse effects. Added nerve glides to address symptoms in back of leg. Pt instructed to do every day unless flared up. Added front plank for core stability. Required verbal cueing and demonstration for hip hinge motion during dead lift.     Kishore Is progressing well towards his goals.   Patient prognosis is Excellent.     Patient will continue to benefit from skilled outpatient physical therapy to address the deficits listed in the problem list box on initial evaluation, provide pt/family education and to maximize pt's level of independence in the home and community  environment.     Patient's spiritual, cultural and educational needs considered and pt agreeable to plan of care and goals.     Anticipated barriers to physical therapy: none    Goals:  Number Goal Progress     Patient will be independent with self management of his/her condition with home exercise program, posture, positioning and improved body mechanics. Progressing - 1/15/2025     Patient will safely transition to and participate in wellness/fitness program. Progressing - 1/15/2025     Patient will report 0/10 pain at worst over a two week period. Progressing - 1/15/2025       Plan     Continue with current plan.    Macrina Lackey, PT

## 2025-02-21 ENCOUNTER — PATIENT MESSAGE (OUTPATIENT)
Dept: ADMINISTRATIVE | Facility: HOSPITAL | Age: 70
End: 2025-02-21
Payer: MEDICARE

## 2025-02-22 DIAGNOSIS — Z12.11 SCREENING FOR COLON CANCER: ICD-10-CM

## 2025-02-26 ENCOUNTER — CLINICAL SUPPORT (OUTPATIENT)
Dept: REHABILITATION | Facility: HOSPITAL | Age: 70
End: 2025-02-26
Payer: MEDICARE

## 2025-02-26 DIAGNOSIS — M53.86 DECREASED RANGE OF MOTION OF LUMBAR SPINE: Primary | ICD-10-CM

## 2025-02-26 PROCEDURE — 97140 MANUAL THERAPY 1/> REGIONS: CPT | Mod: PN

## 2025-02-26 PROCEDURE — 97110 THERAPEUTIC EXERCISES: CPT | Mod: PN

## 2025-02-26 NOTE — PROGRESS NOTES
"[facility]   Physical Therapy Treatment Note/ Discharge Summary     Name: Kishore Sosa  Clinic Number: [id number]    Therapy Diagnosis:   Encounter Diagnosis   Name Primary?    Decreased range of motion of lumbar spine Yes         Physician: Deanna Sebastian MD    Visit Date: 2/26/2025    Physician Orders: PT Eval and Treat   Medical Diagnosis from Referral: Right-sided low back pain without sciatica, unspecified chronicity [M54.50]   Evaluation Date: 1/15/2025  Authorization Period Expiration: 12/27/25  Plan of Care Expiration: 3/14/25  Progress Note Due: 2/15/25  Visit # / Visits authorized: 4/20     Foto  Date  Score    #1/3   83%   #2/3       #3/3          Precautions:  hx PE       Time In: 9:55 am  Time Out: 10:25 am  Total Appointment Time (timed & untimed codes): 30 minutes      Subjective     Patient reports: he has been pain free for the last two weeks. Will DC today.       He was compliant with home exercise program.  Response to previous treatment: decreased pain  Functional change: none yet    Pain: 0/10  Location: right back      Objective      Objective Measures updated at progress report unless specified.     Treatment     Kishore received the treatments listed below:      therapeutic exercises to develop strength and endurance for 10 minutes including:    Treadmill 5' at 3.8 mph  Piriformis stretch     Neuromuscular re-education activities to improve: Coordination, Proprioception, and Posture for 5 minutes. The following activities were included:     Intervention 1/15/2025  Parameters   Cat/cow []  10x    Prone swimmers []  2x10    Front plank []  30" 2x   Bridge  []  On swiss ball    HS curls []  On swiss ball    Nerve glides  [x]  supine          Manual Therapy Techniques: Soft tissue Mobilization were applied to the: lumbar spine for 15 minutes, including:     Manual Intervention 1/15/2025      Soft Tissue Mobilization [x]  right  lumbar erectors   Functional Dry " Needling  [x]  Right lumbar multifidi (L4-5) with stim             Therapeutic Activities to improve functional performance for 00 minutes, including:     Intervention 1/15/2025  Parameters   Squat to bench []  2x10 10#   Dead lift []  1x10 25#     []        []        []        []          Patient Education and Home Exercises       Education provided:   Cont HEP  Begin walking routine    Assessment     Pt tolerated treatment session well today with no adverse effects. Demonstration of nerve glides to make sure pt had correct form.       Goals:  Number Goal Progress     Patient will be independent with self management of his/her condition with home exercise program, posture, positioning and improved body mechanics. MET - 2/26/2025     Patient will safely transition to and participate in wellness/fitness program. MET - 2/26/2025     Patient will report 0/10 pain at worst over a two week period. MET - 2/26/2025       Plan     Discharge from PT.     Macrina Lackey, PT

## 2025-03-13 DIAGNOSIS — E78.5 HYPERLIPIDEMIA, UNSPECIFIED HYPERLIPIDEMIA TYPE: ICD-10-CM

## 2025-03-13 RX ORDER — ATORVASTATIN CALCIUM 20 MG/1
20 TABLET, FILM COATED ORAL
Qty: 90 TABLET | Refills: 3 | Status: SHIPPED | OUTPATIENT
Start: 2025-03-13

## 2025-03-14 DIAGNOSIS — F33.1 MODERATE EPISODE OF RECURRENT MAJOR DEPRESSIVE DISORDER: ICD-10-CM

## 2025-03-14 RX ORDER — BUPROPION HYDROCHLORIDE 300 MG/1
300 TABLET ORAL
Qty: 90 TABLET | Refills: 0 | Status: SHIPPED | OUTPATIENT
Start: 2025-03-14

## 2025-03-19 NOTE — PROGRESS NOTES
Kishore Sosa   1955 03/20/2025        CURRENT PRESENTATION  (psychiatric biopsychosocial evaluation; plan for treatment):   Last visit 12/16/2024 documentation includes:   ...depression PHQ has gone from 6 to 5, and would be even less if there were not issues with fatigue and overeating, and he feels that these are perhaps side effects of Lexapro.  Anxiety PHQ has gone from 11 to 10, in the anxiety continues to surround still not facing the financial issues.  The patient to address fatigue and overeating with an increase Wellbutrin, and he says that he is satisfied the medications helping with anxiety and plans to have hypnotherapy regarding his avoidance of financial situations and then face the situations with his friend Ata when she is available (using the support of the friend was discussed and the last visit, and the friend is willing to help, but she was out of town for 2 months in his now medically preparing for a surgery.)       Encounter Diagnoses   Name Primary?    ZENA (generalized anxiety disorder) Yes    Moderate episode of recurrent major depressive disorder     PLAN:   Follow up in 3 months.    Psychiatry Medication:  Increase Wellbutrin XL to 300 mg daily.  Continue Lexapro 20 mg daily.    In the current session, the patient reports further improvement in his pleased with the efficacy of the medications.  Depression PHQ is 4, anxiety PHQ is 9.  The patient's complaint is continued fatigue from Lexapro, and he asks about a change to Prozac.  No other side effects of Lexapro or Wellbutrin.  Euthymic with no depression, excessively elevated moods, irritable moods, or manic signs or symptoms.  Anxiety is well controlled.  No psychosis.  No thoughts of harm to self or others or feelings of aggression.      Interim history:  Living situation/supports:  The patient says that he checked all of his financial accounts and feels good with this burden lifted.  He reports that the news was good  and of no concern.  He says that he is about to start his taxes and anticipates no problems.  He says despite good financial news, he loves his job and intends to continue working.  His daughter is doing well, and he says that he will plan a trip when she schedules her next improvisational routine.  Last visit-  The patient greatly enjoyed a visit to [facility], and he is very proud of his daughter.  He says that she has a small apartment in a very good area of [place] enjoys her dog and friends.  She has a good job but also excels in a screen writing class and comic improvisation.  She will be coming to visit for 1 week around [place], staying mother but spending time with the patient also.        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  Previously-  His daughter has not pursued TMS yet and continues to state that she is depressed, although she is also engaging in activities that she enjoys.  He is concerned that she has some health problems.  He intends to make explicit to her that the decision about returning to [place] to be near her mother is hers and that she should consider the whole picture, her wishes, what the stepfather is saying, the availability of resources that would assist her mother, and the fact that she is not obligated to move and would be leaving behind significant positives in [facility] if she were to return to [place].  ...The patient says that his biggest area of worry is the financial situation with his halfway.  He indicates that he will be getting social security at age 69 and will still be working to some degree; he reports that he also has a small pension and a couple of halfway plans, the balances of which he has not checked in several years.  He indicates that his plan is to have his friend look at the balances with him in the coming weeks.       The patient reports talking with his friend, a female GEORGES, about once weekly.   He texts with his daughter about every other week and is planning a brief trip to see her in [place] in August.  He notes that she deals with depression and TMS is being considered.  She has a very good job as an  of a group of scientific journals.  He says that her stepfather has been encouraging her to move back to [place] to help care for her mother, who has been diagnosed with mild cognitive impairment; he reports that he has given her the feedback that she should not give up her career and her life in [place].  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  Historically-  Relationships:  The patient lives alone but has frequent text and telephone contact with his daughter who lives in [facility].  His daughter is not  and has no children, and he suspects that she is clements.  He reports that he has a good female friend.  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  Medical issues:  Hyperlipidemia, aortic atherosclerosis, basal cell carcinoma left upper extremity  Nonpsychotropic Medications:  Atorvastatin   Allergies:   Review of patient's allergies indicates:  No Known Allergies  Alcohol use:  None  Other substance use:  None    Mental Status Exam:   Appearance:  Appropriately groomed  Orientation:  X4  Attitude:  Cooperative, engaged   Eye Contact:  Appropriate  Behavior:  Calm, appropriate  Speech:     Rate - WNL    Volume - WNL    Quantity - WNL    Tone - relaxed, appropriate  Pressure - no  Thought Processes:  Goal-directed  Mood:  Euthymic   Affect:  Without distress, euthymic, including ability to brighten at appropriate times  SI:  No, and no thoughts of self-harm  HI:  No, and no thoughts of harm towards others  Paranoia:   No  Delusions:  No  Hallucinations:  No  Attention:  Intact over the course of the session  Cognition:  No deficits noted over the course of the session  Insight:  Intact   Judgment:  Intact  Impulse Control:  Intact       ASSESSMENT:   Encounter Diagnoses   Name Primary?    ZENA (generalized anxiety disorder) Yes    Moderate episode of recurrent major depressive disorder        PLAN:   Follow-up in 2 months.    Continue Wellbutrin  mg daily.  Decrease Lexapro 20 mg to 1/2 tablet every evening for 10 days, then discontinue and begin Prozac 10 mg daily in the morning for 10 days and then 20 mg daily.  Rationale, risks, and side effects were reviewed including suicidal ideations, hillary, serotonin syndrome, confusion, seizures, anxiety, trouble focusing, insomnia, decreased alertness, dizziness, effects on driving and other activities requiring alertness and steadiness, GI upset, headache, sexual side effects, excessive bleeding, and others.      The patient will call if there are side effects with the increase in Wellbutrin or if fatigue and overeating continue at 1 month.  We discussed a switch to Prozac in the fatigue and overeating not resolved with the increase Wellbutrin or there were side effects with the increased dose of Wellbutrin.  The patient has been on Prozac in the past without side effects and with efficacy for an extended time.  Nevertheless, I reviewed with him indications, rationale risks, and side effects, including suicidal ideations, hillary, serotonin syndrome, confusion, seizures, anxiety, trouble focusing, insomnia, decreased alertness, dizziness, effects on driving and other activities requiring alertness and steadiness, GI upset, headache, sexual side effects, excessive bleeding, and others.    Reviewed with patient:  Report side effects, other problems, or questions to the psychiatrist by way of the [facility] portal, [facility], or by calling [facility] at [phone].   Messages are checked during clinic hours only.  For urgent issues outside of clinic hours, call 911 or go to an emergency department.  Follow up with primary care/MD specialist for continued monitoring of general health and wellness and any medical conditions.  Call [facility] at [phone] or use the [facility] portal if necessary for scheduling or rescheduling.  It is the responsibility of the patient to reschedule an appointment if an appointment has been canceled or missed.  Understanding was expressed; and no further concerns or questions were raised at this time.     [id number]  2 chronic problems/conditions  Prescription drug management        Large portions of this note were completed by way of voice recognition dictation software, and transcription errors are possible, such that specific information in the note should be considered in the context of the entire report.

## 2025-03-20 ENCOUNTER — OFFICE VISIT (OUTPATIENT)
Dept: PSYCHIATRY | Facility: CLINIC | Age: 70
End: 2025-03-20
Payer: MEDICARE

## 2025-03-20 VITALS — HEART RATE: 81 BPM | DIASTOLIC BLOOD PRESSURE: 82 MMHG | SYSTOLIC BLOOD PRESSURE: 130 MMHG

## 2025-03-20 DIAGNOSIS — F41.1 GAD (GENERALIZED ANXIETY DISORDER): Primary | ICD-10-CM

## 2025-03-20 DIAGNOSIS — F33.1 MODERATE EPISODE OF RECURRENT MAJOR DEPRESSIVE DISORDER: ICD-10-CM

## 2025-03-20 PROCEDURE — 99211 OFF/OP EST MAY X REQ PHY/QHP: CPT | Mod: PBBFAC | Performed by: PSYCHIATRY & NEUROLOGY

## 2025-03-20 PROCEDURE — 99999 PR PBB SHADOW E&M-EST. PATIENT-LVL I: CPT | Mod: PBBFAC,,, | Performed by: PSYCHIATRY & NEUROLOGY

## 2025-03-20 PROCEDURE — 99214 OFFICE O/P EST MOD 30 MIN: CPT | Mod: S$PBB,,, | Performed by: PSYCHIATRY & NEUROLOGY

## 2025-03-20 PROCEDURE — G2211 COMPLEX E/M VISIT ADD ON: HCPCS | Mod: S$PBB,,, | Performed by: PSYCHIATRY & NEUROLOGY

## 2025-03-20 RX ORDER — FLUOXETINE 10 MG/1
10 CAPSULE ORAL DAILY
Qty: 10 CAPSULE | Refills: 0 | Status: SHIPPED | OUTPATIENT
Start: 2025-03-20

## 2025-03-20 RX ORDER — FLUOXETINE HYDROCHLORIDE 20 MG/1
20 CAPSULE ORAL DAILY
Qty: 90 CAPSULE | Refills: 0 | Status: SHIPPED | OUTPATIENT
Start: 2025-03-20

## 2025-06-05 ENCOUNTER — OFFICE VISIT (OUTPATIENT)
Dept: PSYCHIATRY | Facility: CLINIC | Age: 70
End: 2025-06-05
Payer: MEDICARE

## 2025-06-05 DIAGNOSIS — F41.1 GAD (GENERALIZED ANXIETY DISORDER): Primary | ICD-10-CM

## 2025-06-05 DIAGNOSIS — F33.1 MODERATE EPISODE OF RECURRENT MAJOR DEPRESSIVE DISORDER: ICD-10-CM

## 2025-06-05 PROCEDURE — 99214 OFFICE O/P EST MOD 30 MIN: CPT | Mod: S$PBB,,, | Performed by: PSYCHIATRY & NEUROLOGY

## 2025-06-05 PROCEDURE — G2211 COMPLEX E/M VISIT ADD ON: HCPCS | Mod: ,,, | Performed by: PSYCHIATRY & NEUROLOGY

## 2025-06-05 RX ORDER — FLUOXETINE HYDROCHLORIDE 40 MG/1
40 CAPSULE ORAL DAILY
Qty: 90 CAPSULE | Refills: 0 | Status: SHIPPED | OUTPATIENT
Start: 2025-06-05

## 2025-06-05 RX ORDER — BUPROPION HYDROCHLORIDE 300 MG/1
300 TABLET ORAL DAILY
Qty: 90 TABLET | Refills: 0 | Status: SHIPPED | OUTPATIENT
Start: 2025-06-05

## 2025-06-10 ENCOUNTER — TELEPHONE (OUTPATIENT)
Dept: PRIMARY CARE CLINIC | Facility: CLINIC | Age: 70
End: 2025-06-10
Payer: MEDICARE

## 2025-07-28 DIAGNOSIS — Z00.00 ENCOUNTER FOR MEDICARE ANNUAL WELLNESS EXAM: ICD-10-CM

## 2025-08-04 NOTE — PROGRESS NOTES
Kishore Sosa   1955 08/06/2025        CURRENT PRESENTATION  (psychiatric biopsychosocial evaluation; plan for treatment):   Last visit 03/2025 documentation includes:   ...the patient reports that depression remains in remission, but he notes some return of worry and transient irritability in the face of frustrations.  PHQ depression remains at 4.  PHQ anxiety has increased to 14.  Questioning yields no hillary, hypomania, psychosis, thoughts of harm to self or others, or feelings of aggression.  Including with specific questioning, no side effects of medications apart from tolerable sweating with Wellbutrin.  He indicates that fatigue resolved off Lexapro and the problem of overeating decreased, such that he has liked the transition to Prozac in that regard.  With discussion, he wishes to continue the combination of Wellbutrin and Prozac, with an increase in Prozac to 40 mg daily.       Encounter Diagnoses   Name Primary?    ZENA (generalized anxiety disorder) Yes    Moderate episode of recurrent major depressive disorder     PLAN:   Follow-up in 2 months.  Increase Prozac to 40 mg daily and continue Wellbutrin  mg daily.  Risks and side effects of the increase were reviewed, and the patient requests increase.    In the current session, the patient's chief complaints are longstanding problems with insomnia that appear to be independent of other psychiatric symptoms.  Discussion of the patient's sleep problems indicate that they are chronic, longstanding, and still present even when mental health symptoms are in remission.  The description is consistent with primary insomnia.      The patient reports that he feels fatigued when he gets insufficient sleep.  He says that he is otherwise doing well.  PHQ depression is 5 and PHQ anxiety is 6.      Euthymic with no depression, excessively elevated moods, irritable moods, or manic signs or symptoms.  Anxiety is well controlled.  No psychosis.  No  thoughts of harm to self or others or feelings of aggression.  Including with specific questioning, no side effects of Prozac or Wellbutrin.    Interim history:  Living situation/supports:  The patient is putting papers together for Tax [employer] to do his taxes.  His daughter Ana Paula is doing well.  She is visiting in September and in December she is going on a 3 country trip with her mother; the patient indicates that his daughter is a very good traveler and the mother's request to go on the trip indicates that her health and cognitive status must not be showing significant impairment.  He says that he himself may visit the daughter before or after that trip if she has an improvisation show.  He continues regular contact with Ata but is concerned about Ata's health.  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  Last visit-  The patient has made initial steps with regards to doing his taxes.  He says that he has paid taxes every year but has not actually calculated what was owed.  He says that in the past when calculations were done, he was always due a refund but with regards to 3 years of taxes, he had waited too long to be able to collect what was owed to him.  His friend Ata continues to be a support.  His daughter Ana Paula is doing well apart from feeling that she cannot advance in her job, such that she is considering a job change including a possible move from [place], [place]; the patient has concerned in this regard because the daughter otherwise likes her apartment, her friends, in living in [place].  Previously-  The patient says that he checked all of his financial accounts and feels good with this burden lifted.  He reports that the news was good and of no concern.  He says that he is about to start his taxes and anticipates no problems.  He says despite good financial news, he loves his job and intends to  continue working.  His daughter is doing well, and he says that he will plan a trip when she schedules her next improvisational routine.  ...The patient greatly enjoyed a visit to [facility], and he is very proud of his daughter.  He says that she has a small apartment in a very good area of [place] enjoys her dog and friends.  She has a good job but also excels in a screen writing class and comic improvisation.  She will be coming to visit for 1 week around [place], staying mother but spending time with the patient also.        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  ...His daughter has not pursued TMS yet and continues to state that she is depressed, although she is also engaging in activities that she enjoys.  He is concerned that she has some health problems.  He intends to make explicit to her that the decision about returning to [place] to be near her mother is hers and that she should consider the whole picture, her wishes, what the stepfather is saying, the availability of resources that would assist her mother, and the fact that she is not obligated to move and would be leaving behind significant positives in [facility] if she were to return to [place].  Historically-  Relationships:  The patient lives alone but has frequent text and telephone contact with his daughter who lives in [facility].  His daughter is not  and has no children, and he suspects that she is clements.  He reports that he has a good female friend.  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  Medical issues:  Hyperlipidemia, aortic atherosclerosis, basal cell carcinoma left upper extremity  Nonpsychotropic Medications:  Atorvastatin   Allergies:   Review of  patient's allergies indicates:  No Known Allergies  Alcohol use:  None  Other substance use:  None    Mental Status Exam:   Appearance:  Appropriately groomed  Orientation:  X4  Attitude:  Cooperative, engaged   Eye Contact:  Appropriate  Behavior:  Calm, appropriate  Speech:     Rate - WNL    Volume - WNL    Quantity - WNL    Tone - relaxed, appropriate  Pressure - no  Thought Processes:  Goal-directed  Mood:  Euthymic  Affect:  Without distress, euthymic, including ability to brighten at appropriate times  SI:  No, and no thoughts of self-harm  HI:  No, and no thoughts of harm towards others  Paranoia:  No  Delusions:  No  Hallucinations:  No  Attention:  Intact over the course of the session  Cognition:  No deficits noted over the course of the session  Insight:  Intact   Judgment:  Intact  Impulse Control:  Intact       ASSESSMENT:   Encounter Diagnoses   Name Primary?    Moderate episode of recurrent major depressive disorder Yes    ZENA (generalized anxiety disorder)     Primary insomnia            PLAN:    follow up in 3 months.  Continue Prozac 40 mg daily and Wellbutrin  mg daily long discussion with regards to rationale, risks, and side effects of trazodone, and the patient requests the medication.  He has no baseline orthostasis.  The review included priapism and the necessary steps for the side effect and potential sequelae, sedation, unsteadiness, interference with driving and other activities requiring alertness and steadiness, orthostasis and other cardiovascular issues, headache, GI upset, disturbing dreams, anxiety, hillary, decreased focus or confusion, suicidal ideations, and others.      Reviewed with patient:  Report side effects, other problems, or questions to the psychiatrist by way of the [facility] portal, [facility], or by calling [facility] at [phone].  Messages are checked during clinic hours only.  For urgent issues outside of clinic hours, call 911 or go to an  emergency department.  Follow up with primary care/MD specialist for continued monitoring of general health and wellness and any medical conditions.  Call [facility] at [phone] or use the [facility] portal if necessary for scheduling or rescheduling.  It is the responsibility of the patient to reschedule an appointment if an appointment has been canceled or missed.  Understanding was expressed; and no further concerns or questions were raised at this time.     [id number]  2 chronic problems/conditions  Prescription drug management        Large portions of this note were completed by way of voice recognition dictation software, and transcription errors are possible, such that specific information in the note should be considered in the context of the entire report.

## 2025-08-06 ENCOUNTER — OFFICE VISIT (OUTPATIENT)
Dept: PSYCHIATRY | Facility: CLINIC | Age: 70
End: 2025-08-06
Payer: MEDICARE

## 2025-08-06 VITALS — DIASTOLIC BLOOD PRESSURE: 77 MMHG | SYSTOLIC BLOOD PRESSURE: 126 MMHG | HEART RATE: 80 BPM

## 2025-08-06 DIAGNOSIS — F41.1 GAD (GENERALIZED ANXIETY DISORDER): ICD-10-CM

## 2025-08-06 DIAGNOSIS — F33.1 MODERATE EPISODE OF RECURRENT MAJOR DEPRESSIVE DISORDER: Primary | ICD-10-CM

## 2025-08-06 DIAGNOSIS — F51.01 PRIMARY INSOMNIA: ICD-10-CM

## 2025-08-06 PROCEDURE — G2211 COMPLEX E/M VISIT ADD ON: HCPCS | Mod: ,,, | Performed by: PSYCHIATRY & NEUROLOGY

## 2025-08-06 PROCEDURE — 99211 OFF/OP EST MAY X REQ PHY/QHP: CPT | Mod: PBBFAC | Performed by: PSYCHIATRY & NEUROLOGY

## 2025-08-06 PROCEDURE — 99999 PR PBB SHADOW E&M-EST. PATIENT-LVL I: CPT | Mod: PBBFAC,,, | Performed by: PSYCHIATRY & NEUROLOGY

## 2025-08-06 PROCEDURE — 99214 OFFICE O/P EST MOD 30 MIN: CPT | Mod: S$PBB,,, | Performed by: PSYCHIATRY & NEUROLOGY

## 2025-08-06 RX ORDER — TRAZODONE HYDROCHLORIDE 50 MG/1
50 TABLET ORAL NIGHTLY PRN
Qty: 90 TABLET | Refills: 0 | Status: SHIPPED | OUTPATIENT
Start: 2025-08-06

## 2025-08-06 RX ORDER — FLUOXETINE HYDROCHLORIDE 40 MG/1
40 CAPSULE ORAL DAILY
Qty: 90 CAPSULE | Refills: 0 | Status: SHIPPED | OUTPATIENT
Start: 2025-08-06

## 2025-08-06 RX ORDER — BUPROPION HYDROCHLORIDE 300 MG/1
300 TABLET ORAL DAILY
Qty: 90 TABLET | Refills: 0 | Status: SHIPPED | OUTPATIENT
Start: 2025-08-06